# Patient Record
Sex: FEMALE | Race: WHITE | Employment: FULL TIME | URBAN - METROPOLITAN AREA
[De-identification: names, ages, dates, MRNs, and addresses within clinical notes are randomized per-mention and may not be internally consistent; named-entity substitution may affect disease eponyms.]

---

## 2017-05-24 ENCOUNTER — HOSPITAL ENCOUNTER (OUTPATIENT)
Dept: OTHER | Age: 37
Discharge: HOME | End: 2017-05-24
Attending: OBSTETRICS & GYNECOLOGY | Admitting: OBSTETRICS & GYNECOLOGY

## 2020-05-06 ENCOUNTER — HOSPITAL ENCOUNTER (OUTPATIENT)
Age: 40
Setting detail: SPECIMEN
Discharge: HOME | End: 2020-05-06
Payer: COMMERCIAL

## 2020-05-06 PROCEDURE — U0002 COVID-19 LAB TEST NON-CDC: HCPCS

## 2020-05-07 LAB
SARS-COV-2: NOT DETECTED
SOURCE: NORMAL

## 2020-06-17 ENCOUNTER — OFFICE VISIT (OUTPATIENT)
Dept: PRIMARY CARE CLINIC | Age: 40
End: 2020-06-17
Payer: COMMERCIAL

## 2020-06-17 ENCOUNTER — HOSPITAL ENCOUNTER (OUTPATIENT)
Age: 40
Setting detail: SPECIMEN
Discharge: HOME | End: 2020-06-17
Payer: COMMERCIAL

## 2020-06-17 VITALS — HEART RATE: 78 BPM | OXYGEN SATURATION: 98 % | TEMPERATURE: 98.7 F

## 2020-06-17 PROCEDURE — 99213 OFFICE O/P EST LOW 20 MIN: CPT | Performed by: NURSE PRACTITIONER

## 2020-06-17 PROCEDURE — U0002 COVID-19 LAB TEST NON-CDC: HCPCS

## 2020-06-17 NOTE — PATIENT INSTRUCTIONS
you think you may need emergency care. For example, call if you have life-threatening symptoms, such as:  · You have severe trouble breathing. (You can't talk at all.)  · You have constant chest pain or pressure. · You are severely dizzy or lightheaded. · You are confused or can't think clearly. · Your face and lips have a blue color. · You pass out (lose consciousness) or are very hard to wake up. Call your doctor now or seek immediate medical care if:  · You have moderate trouble breathing. (You can't speak a full sentence.)  · You are coughing up blood (more than about 1 teaspoon). · You have signs of low blood pressure. These include feeling lightheaded; being too weak to stand; and having cold, pale, clammy skin. Watch closely for changes in your health, and be sure to contact your doctor if:  · Your symptoms get worse. · You are not getting better as expected. Call before you go to the doctor's office. Follow their instructions. And wear a cloth face cover. Current as of: May 8, 2020               Content Version: 12.5  © 2006-2020 [employer]. Care instructions adapted under license by [facility] ([facility]). If you have questions about a medical condition or this instruction, always ask your healthcare professional. [address] any warranty or liability for your use of this information.

## 2020-06-18 LAB
SARS-COV-2: NOT DETECTED
SOURCE: NORMAL

## 2021-03-02 ENCOUNTER — HOSPITAL ENCOUNTER (OUTPATIENT)
Dept: INFUSION THERAPY | Age: 41
Discharge: HOME | End: 2021-03-02
Payer: COMMERCIAL

## 2021-03-02 ENCOUNTER — HOSPITAL ENCOUNTER (OUTPATIENT)
Dept: RADIATION ONCOLOGY | Age: 41
Discharge: HOME | End: 2021-03-02
Payer: COMMERCIAL

## 2021-03-02 VITALS
TEMPERATURE: 98.1 F | HEIGHT: 65 IN | OXYGEN SATURATION: 99 % | BODY MASS INDEX: 27.49 KG/M2 | HEART RATE: 86 BPM | SYSTOLIC BLOOD PRESSURE: 120 MMHG | DIASTOLIC BLOOD PRESSURE: 69 MMHG | RESPIRATION RATE: 16 BRPM | WEIGHT: 165 LBS

## 2021-03-02 DIAGNOSIS — C73 MALIGNANT NEOPLASM OF THYROID GLAND (HCC): ICD-10-CM

## 2021-03-02 LAB — TSH HIGH SENSITIVITY: 3.32 UIU/ML (ref 0.27–4.2)

## 2021-03-02 PROCEDURE — 86800 THYROGLOBULIN ANTIBODY: CPT

## 2021-03-02 PROCEDURE — 84432 ASSAY OF THYROGLOBULIN: CPT

## 2021-03-02 PROCEDURE — 36415 COLL VENOUS BLD VENIPUNCTURE: CPT

## 2021-03-02 PROCEDURE — 84443 ASSAY THYROID STIM HORMONE: CPT

## 2021-03-02 PROCEDURE — 99202 OFFICE O/P NEW SF 15 MIN: CPT

## 2021-03-02 RX ORDER — PHENTERMINE AND TOPIRAMATE 15; 92 MG/1; MG/1
CAPSULE, EXTENDED RELEASE ORAL
COMMUNITY
Start: 2021-02-12 | End: 2021-05-20

## 2021-03-02 NOTE — CONSULTS
Radiation Oncology Consultation  Encounter Date: 3/2/2021 1:23 PM    Ms. Claudia Chi is a 38 y.o. female  : 1980  MRN: [id number]  Acct Number: [de-identified]  Requesting Provider: No att. providers found        CONSULTANT: Jairo Leon    PHYSICIANS:   Primary Care: MD Dr. Josselyn Disla        DIAGNOSIS: Differentiated thyroid cancer      Cancer Staging  Malignant neoplasm of thyroid gland [facility])  Staging form: Thyroid - Differentiated, AJCC 8th Edition  - Pathologic: Stage I (pT1b, pN0, cM0, Age at diagnosis: < 54 years) - Signed by Jairo Leon MD on 3/2/2021         TREATMENT COURSE:  Oncology History   Malignant neoplasm of thyroid gland ([facility] [address]   2021 Initial Diagnosis    Malignant neoplasm of thyroid gland ([facility] [address]     2021 Surgery    Thyroid right lobe and isthmus partial thyroidectomy           HPI:   Today I had the privilege of seeing Claudia Chi in consultation. As you recall, Claudia Chi is a 38 y.o. female who was recently found to have thyroid cancer. She is previously in a fairly good state of health, however noticed some mild dysphagia. She was seen for a routine checkup by her primary care physician who noticed a thyroid mass. She underwent an ultrasound of the thyroid on 20, revealing a TI-RADS 5 nodule on the right measuring 1.9 cm in greatest dimension with TI-RADS 4 nodules bilaterally with the largest measuring 2 cm. On 20, she underwent FNA showing benign findings. She reports there have been multiple family members with a history of thyroid cancer. On  she underwent a partial thyroidectomy with resection of the right thyroid lobe and isthmus. 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 No angiolymphatic or perineural invasion was seen.   Diffusely nodular background thyroid parenchyma with variable nuclear atypia was noted. Some lesions were felt to represent early or incident papillary thyroid microcarcinoma is or some be considered non-invasive follicular thyroid neoplasm with papillary-like nuclear features. She presents today for consideration of her treatment options. Postoperatively, she reports she's been doing quite well. She does have fatigue. She denies any paresthesias. The dysphasia has resolved. She denies any Isabelle aphasia. She has not been experiencing any fevers, chills, or drenching night sweats. Her weight and appetite have been stable. Past Medical History:   Diagnosis Date    Arthritis     Hiatal hernia     Hyperlipidemia     IBS (irritable bowel syndrome)     Irregular heart beat     Migraine         Past Surgical History:   Procedure Laterality Date     SECTION      CHOLECYSTECTOMY      DILATION AND CURETTAGE OF UTERUS      ENDOMETRIAL ABLATION      HYSTERECTOMY      TUBAL LIGATION         History reviewed. No pertinent family history.     Social History     Socioeconomic History    Marital status:      Spouse name: Not on file    Number of children: Not on file    Years of education: Not on file    Highest education level: Not on file   Occupational History    Not on file   Social Needs    Financial resource strain: Not on file    Food insecurity     Worry: Not on file     Inability: Not on file    Transportation needs     Medical: Not on file     Non-medical: Not on file   Tobacco Use    Smoking status: Current Every Day Smoker     Packs/day: 0.25    Smokeless tobacco: Never Used   Substance and Sexual Activity    Alcohol use: No    Drug use: No    Sexual activity: Not on file   Lifestyle    Physical activity     Days per week: Not on file     Minutes per session: Not on file    Stress: Not on file   Relationships    Social connections     Talks on phone: Not on file     Gets together: Not on file     Attends Alevism service: Not on file     Active member of club or organization: Not on file     Attends meetings of clubs or organizations: Not on file     Relationship status: Not on file    Intimate partner violence     Fear of current or ex partner: Not on file     Emotionally abused: Not on file     Physically abused: Not on file     Forced sexual activity: Not on file   Other Topics Concern    Not on file   Social History Narrative    Not on file           ALLERGIES: No Known Allergies     Current Outpatient Medications   Medication Sig Dispense Refill    QSYMIA 15-92 MG CP24 TAKE 1 CAPSULE BY MOUTH EVERY DAY IN THE MORNING DNF 02/08/21      COLLAGEN-VITAMIN C PO Take by mouth      FIBER ADULT GUMMIES PO Take by mouth      Multiple Vitamins-Calcium (ONE-A-DAY WOMENS FORMULA PO) Take by mouth       No current facility-administered medications for this encounter.       Outpatient Medications Marked as Taking for the 3/2/21 encounter [facility] Encounter) with Oxana Romero MD   Medication Sig Dispense Refill    QSYMIA 15-92 MG CP24 TAKE 1 CAPSULE BY MOUTH EVERY DAY IN THE MORNING DNF 02/08/21      COLLAGEN-VITAMIN C PO Take by mouth      FIBER ADULT GUMMIES PO Take by mouth      Multiple Vitamins-Calcium (ONE-A-DAY WOMENS FORMULA PO) Take by mouth            LABORATORY STUDIES:   CBC:   Lab Results   Component Value Date    WBC 8.2 05/03/2017    RBC 4.86 05/03/2017    HGB 15.3 05/03/2017    HCT 43.9 05/03/2017    MCV 90.3 05/03/2017    MCH 31.5 05/03/2017    MCHC 34.9 05/03/2017    RDW 11.7 05/03/2017     05/03/2017    MPV 9.6 05/03/2017     CMP:  Lab Results   Component Value Date     05/03/2017    K 4.3 05/03/2017     05/03/2017    CO2 23 05/03/2017    BUN 9 05/03/2017    CREATININE 0.6 05/03/2017    GFRAA >60 05/03/2017    LABGLOM >60 05/03/2017    GLUCOSE 96 05/03/2017    PROT 7.1 05/03/2017    PROT 7.0 07/12/2011    LABALBU 4.3 05/03/2017    CALCIUM 9.1 05/03/2017    BILITOT 0.2 2017    ALKPHOS 36 2017    AST 16 2017    ALT 17 2017     Onc labs: No results found for: PSA, CEA, LDH, AFP    PATHOLOGY:   21 right thyroid lobectomy and isthmus removal:  1.9 cm papillary thyroid carcinoma well-demarcated follicular variant with parenchymal invasion and trabecular growth. No angiolymphatic or perineural invasion. Margins negative. Diffusely nodular background thyroid (nodules 0.1-1.0 cm) with a variable nuclear atypia ranging from benign hyperplastic to those that approach papillary thyroid carcinoma. Lesion may represent early or incipient papillary thyroid microcarcinoma is or some could be considered noninvasive follicular thyroid neoplasms with papillary-like nuclear features      REVIEW OF SYSTEMS:   Constitutional:  Patient denies fevers, rigors, or drenching night sweats. The patient's weight and appetite have been stable. Eyes:  The patient denies any recent visual changes, diplopia, or cataracts  ENMT:  The patient denies any ear pain, hearing changes, or nosebleeds  Respiratory:  The patient denies any SOB, hemoptysis, or green sputum production  Cardiovascular: The patient denies any chest pain, leg edema, or fainting spells  GI:  Patient denies nausea, vomiting, diarrhea, melena, or hematochezia  : Patient denies dysuria, hematuria, or urinary incontinence. Integumentary: patient denies skin rashes, pruritis, or arm edema  Endocrine:  Patient denies any diabetic problems  Neurologic: Patient denies headaches, focal weakness, or disorientation    PHYSICAL EXAMINATION:   VITAL SIGNS: /69   Pulse 86   Temp 98.1 °F (36.7 °C) (Oral)   Resp 16   Ht 5' 5\" (1.651 m)   Wt 165 lb (74.8 kg)   LMP 2017   SpO2 99%   BMI 27.46 kg/m²   ECOG PERFORMANCE STATUS: 0  PAIN RATIN    GENERAL: Pleasant well-developed adult in no acute distress. Alert and oriented ×3  SKIN: Warm and dry, without jaundice, ecchymoses, or petechiae.   HEENT: Normocephalic, atraumatic. PERRLA, Sclerae anicteric, oral mucosa moist without lesion or exudate in the visible oral cavity or oropharynx,  NECK:  No JVD; Supple. No cervical, supraclavicular, or infraclavicular lymphadenopathy is palpable. A healing thyroidectomy incision is present and without signs of infection. THORAX: No palpable axillary adenopathy;  LUNGS: Bilaterally clear to auscultation. No rales, rhonci, or wheezing are present. Good inspiratory effort, no accessory muscle use. CARDIAC: Regular rate and rhythm, without murmurs, clicks, or gallops   ABDOMEN: Normoactive bowels sounds are present. Soft. Non-tender and non-distended. No hepatosplenomegaly or masses are present. EXTREMITIES: No cyanosis, clubbing or edema is present. FROM  NEUROLOGIC: Gait unremarkable. The patient is alert and oriented. CN II-XII are grossly intact. Sensation to light touch is intact and symmetric in the fingers and feet. Muscle strength is 5/5 in both the upper and lower extremities. IMPRESSION/PLAN:  Carrillo Rick is a 38 y.o. female who was recently found to have a differentiated 1.9 cm papillary carcinoma the thyroid. I have reviewed the patient's radiographic images. The treatment options were discussed in detail with the patient. I will obtain labs today to check thyroglobulin level and will be watching for the final pathology from her upcoming completion thyroidectomy prior to making final recommendations. Should she require I-131 therapy, the potential acute side effects and chronic complications of P-290 therapy along with the preparatory procedures and strict radiation isolation precautions were discussed in detail with the patient. The patient voiced understanding, and the patient's questions were answered. The patient has decided to proceed with I-131 therapy, if indicated pending the above.   I will schedule her to return to see me approximately 2 weeks after her completion thyroidectomy. Thank-you for allowing me to participate in the care of this very pleasant patient.           Electronically signed by Jacek Hope MD on 3/2/2021 at 1:23 PM

## 2021-03-03 LAB
ANTITHYROGLOBULIN AB: <0.9 IU/ML (ref 0–4)
THYROGLOBULIN BY LC-MS/MS, SERUM/PLASMA: ABNORMAL NG/ML (ref 1.3–31.8)
THYROGLOBULIN: 139.9 NG/ML (ref 1.3–31.8)

## 2021-05-20 ENCOUNTER — HOSPITAL ENCOUNTER (OUTPATIENT)
Dept: RADIATION ONCOLOGY | Age: 41
Discharge: HOME | End: 2021-05-20
Payer: COMMERCIAL

## 2021-05-20 VITALS
BODY MASS INDEX: 29.52 KG/M2 | OXYGEN SATURATION: 99 % | WEIGHT: 177.2 LBS | HEART RATE: 72 BPM | TEMPERATURE: 98.5 F | HEIGHT: 65 IN | SYSTOLIC BLOOD PRESSURE: 125 MMHG | RESPIRATION RATE: 16 BRPM | DIASTOLIC BLOOD PRESSURE: 78 MMHG

## 2021-05-20 DIAGNOSIS — C73 MALIGNANT NEOPLASM OF THYROID GLAND (HCC): ICD-10-CM

## 2021-05-20 PROCEDURE — 99214 OFFICE O/P EST MOD 30 MIN: CPT | Performed by: RADIOLOGY

## 2021-05-20 PROCEDURE — 99211 OFF/OP EST MAY X REQ PHY/QHP: CPT

## 2021-05-20 PROCEDURE — G0463 HOSPITAL OUTPT CLINIC VISIT: HCPCS

## 2021-05-20 RX ORDER — ANTIARTHRITIC COMBINATION NO.2 900 MG
1 TABLET ORAL DAILY
COMMUNITY
End: 2022-01-27

## 2021-05-20 ASSESSMENT — PAIN SCALES - GENERAL: PAINLEVEL_OUTOF10: 0

## 2021-05-20 NOTE — PROGRESS NOTES
Chelo Snow  5/20/2021    Patient is seen today for follow up. Vitals:    05/20/21 1339   BP: 125/78   Pulse: 72   Resp: 16   Temp: 98.5 °F (36.9 °C)   SpO2: 99%        Oxygen Therapy  SpO2: 99 %  Pulse Oximeter Device Mode: Continuous  Pulse Oximeter Device Location: Left, Finger  O2 Device: None (Room air)    Wt Readings from Last 3 Encounters:   05/20/21 177 lb 3.2 oz (80.4 kg)   03/02/21 165 lb (74.8 kg)   03/16/18 180 lb (81.6 kg)       Pain Assessment  Pain Assessment: 0-10  Pain Level: 0  Patient's Stated Pain Goal: No pain  Denies Need for Intervention     No Known Allergies     Current Outpatient Medications   Medication Sig Dispense Refill    Biotin 5000 MCG TABS Take 1 tablet by mouth daily      FIBER ADULT GUMMIES PO Take by mouth      Multiple Vitamins-Calcium (ONE-A-DAY WOMENS FORMULA PO) Take by mouth       No current facility-administered medications for this encounter. Additional Comments: She states she has a spot on mid neck. Wants to discuss pathology results and next steps.       Electronically signed by Asha Sauceda CMA on 5/20/2021 at 1:42 PM

## 2021-05-20 NOTE — PROGRESS NOTES
[address] [facility]  [address] [facility], [address] [facility]  Phone: [phone]  Fax: [phone]    RADIATION ONCOLOGY FOLLOW UP REPORT    PATIENT NAME:  Jaylon Ghotra              : 1980  MEDICAL RECORD NO: [id number]    [facility] NO: [id number]        PROVIDER: Esa Michel MD      DATE OF SERVICE: 2021     FOLLOW UP PHYSICIANS:  ADILE Wellington M.D., M.D.  [address] [employer] [facility], [address] [place] [address]          DIAGNOSIS: Cancer Staging  Malignant neoplasm of thyroid gland [facility])  Staging form: Thyroid - Differentiated, AJCC 8th Edition  - Pathologic: Stage I (pT1b, pN0, cM0, Age at diagnosis: < [age] [facility] years) - Signed by Esa Michel MD on 3/2/2021         TREATMENT COURSE:   Oncology History   Malignant neoplasm of thyroid gland ([facility] [address]   2021 Initial Diagnosis    Malignant neoplasm of thyroid gland ([facility] [address]     2021 Surgery    Thyroid right lobe and isthmus partial thyroidectomy         HPI:   Jaylon Ghotra is a 42 y.o. female who has a history as above who was initially seen by me in consultation on 3/2/21 after undergoing a right thyroid lobectomy and isthmus removal.  Since that time, she did undergo a completion thyroidectomy on 21. On pathologic review the specimen, she was noted to have within the left lobe a 9 mm papillary thyroid carcinoma follicular variant with capsular invasion in a background of multiple follicular nodules with features and ranging from benign follicular nodules to noninvasive follicular thyroid neoplasm with papillary-like nuclear features. The margins were negative. Currently, the patient reports to be doing well. Energy level has been fairly good overall, however her stamina has been somewhat low. The patient denies any fevers, chills, or drenching night sweats. Weight and appetite have been stable. Denies any chest pain or shortness of breath.   Denies any new lumps or bumps anywhere throughout the body. Denies the new onset of bony pain. The patient has not been experiencing any dysphagia, odynophagia, salivary gland pain, or taste changes. She reports she had hoarseness day 1 postop which subsequently resolved. She does report some swelling in her neck which has been improving with time. The patient denies any paresthesias. She has not taken thyroid hormone supplementation postoperatively. The patient reports compliance with CDC Covid 19 measures      LABORATORY STUDIES:   CBC:   Lab Results   Component Value Date    WBC 8.2 05/03/2017    RBC 4.86 05/03/2017    HGB 15.3 05/03/2017    HCT 43.9 05/03/2017    MCV 90.3 05/03/2017    MCH 31.5 05/03/2017    MCHC 34.9 05/03/2017    RDW 11.7 05/03/2017     05/03/2017    MPV 9.6 05/03/2017     CMP:  Lab Results   Component Value Date     05/03/2017    K 4.3 05/03/2017     05/03/2017    CO2 23 05/03/2017    BUN 9 05/03/2017    CREATININE 0.6 05/03/2017    GFRAA >60 05/03/2017    LABGLOM >60 05/03/2017    GLUCOSE 96 05/03/2017    PROT 7.1 05/03/2017    PROT 7.0 07/12/2011    LABALBU 4.3 05/03/2017    CALCIUM 9.1 05/03/2017    BILITOT 0.2 05/03/2017    ALKPHOS 36 05/03/2017    AST 16 05/03/2017    ALT 17 05/03/2017     3/3/2021 10:04 PM - [facility] Incoming Lab Results From [employer] ([employer])    Component Value Ref Range & Units Status Collected Lab   Thyroglobulin 139.9High   1.3 - 31.8 ng/mL Final 03/02/2021  1:52 PM [facility]   (NOTE)   INTERPRETIVE INFORMATION: Thyroglobulin, Serum or Plasma   Specimens negative for thyroglobulin antibodies (TgAb) are tested   for thyroglobulin (Tg) by chemiluminescent immunoassay (ALE) using   the [employer] Access DxI method. Specimens with TgAb results   above the upper reference limit are tested for Tg by   high-performance liquid chromatography-tandem mass spectrometry   (LC-MS/MS). Results obtained with different test methods or kits   cannot be used interchangeably.  Tg results, regardless of   concentration, should not be interpreted as absolute evidence for   the presence or absence of papillary or follicular thyroid cancer. Tg testing is not recommended for use as a screening procedure to   detect the presence of thyroid cancer in the general population. Antithyroglobulin Ab <0.9  0.0 - 4.0 IU/mL Final 03/02/2021  1:52 PM      3/2/2021  3:24 PM - [facility] Incoming Lab Results From [employer] ([employer])    Component Value Ref Range & Units Status Collected Lab   TSH, High Sensitivity 3.320  0.270 - 4.20 uIu/ml Final 03/02/2021  1:52 PM        MEDICATIONS:   Current Outpatient Medications   Medication Sig Dispense Refill    Biotin 5000 MCG TABS Take 1 tablet by mouth daily      FIBER ADULT GUMMIES PO Take by mouth      Multiple Vitamins-Calcium (ONE-A-DAY WOMENS FORMULA PO) Take by mouth       No current facility-administered medications for this encounter. EXAMINATION:   Vitals:    05/20/21 1339   BP: 125/78   Pulse: 72   Resp: 16   Temp: 98.5 °F (36.9 °C)   SpO2: 99%     The patient is in no acute distress. Neck: Supple no preauricular postauricular, submental, submandibular, cervical, supraclavicular, or infraclavicular lymphadenopathy is present. She does have mild edema of the low neck anteriorly consistent with postoperative changes and a healing surgical incision site without signs of infection. Heart: Regular rate and rhythm. No murmurs, clicks, or gallops are present. The thyroid bed is without masses or areas of nodularity. Lungs: Bilaterally clear to auscultation. No rales, rhonchi, or wheezing are present. Abdomen: Soft, nontender and nondistended. No hepatosplenomegaly or masses are appreciated. Extremities: No cyanosis, clubbing, or edema is present. Sensation to light touch is intact and symmetric bilaterally in the fingers and feet.     ASSESSMENT AND PLAN:     Snehal Barroso is a 42 y.o. female who was recently found to have multifocal differentiated thyroid cancer with the largest focus being 1.9 cm of papillary thyroid carcinoma well demarcated follicular variant on the left and 9 mm papillary thyroid carcinoma follicular variant with capsular invasion on the right who is now approximately 1 week after completion thyroidectomy. I do believe she benefit from I-131 therapy. The potential acute side effects, chronic complications, radiation isolation precautions, and preparatory procedures were discussed in detail with the patient at the patient voiced understanding and her questions were answered. She's decided to proceed in this fashion. I will obtain labs at 1 month postop. She is to begin her low iodine diet at 2 weeks postop with tentative plans to treat her at 5 weeks postop with I-131 therapy. The patient is to continue to follow CDC's Covid 19 precautionary measures.       Electronically signed by Norma Sheth MD on 5/20/2021 at 2:09 PM

## 2021-06-09 ENCOUNTER — TELEPHONE (OUTPATIENT)
Dept: RADIATION ONCOLOGY | Age: 41
End: 2021-06-09

## 2021-06-09 DIAGNOSIS — C73 MALIGNANT NEOPLASM OF THYROID GLAND (HCC): ICD-10-CM

## 2021-06-09 NOTE — TELEPHONE ENCOUNTER
Pt started Low Iodine diet 6-1-2021 and was concerned today with a 3 lbs weight gain. Reports increased edema and tightness in hands and feet, more noticeable past couple days. Pt reports puffiness under eyes as well. Denies any other issues such as shortness of breathe or chest pain. Pt scheduled for lab draw tomorrow afternoon, advised pt scheduled for flu up for further evaluation at that time, pt states she is comfortable with this plan. If symptoms worsen, pt advised to go to ER. Pt voices understanding of the above.

## 2021-06-10 ENCOUNTER — HOSPITAL ENCOUNTER (OUTPATIENT)
Dept: RADIATION ONCOLOGY | Age: 41
Discharge: HOME | End: 2021-06-10
Payer: COMMERCIAL

## 2021-06-10 ENCOUNTER — HOSPITAL ENCOUNTER (OUTPATIENT)
Dept: INFUSION THERAPY | Age: 41
Discharge: HOME | End: 2021-06-10
Payer: COMMERCIAL

## 2021-06-10 VITALS
DIASTOLIC BLOOD PRESSURE: 80 MMHG | HEIGHT: 65 IN | HEART RATE: 69 BPM | SYSTOLIC BLOOD PRESSURE: 133 MMHG | OXYGEN SATURATION: 98 % | WEIGHT: 183.4 LBS | RESPIRATION RATE: 16 BRPM | TEMPERATURE: 98.2 F | BODY MASS INDEX: 30.56 KG/M2

## 2021-06-10 DIAGNOSIS — C73 MALIGNANT NEOPLASM OF THYROID GLAND (HCC): ICD-10-CM

## 2021-06-10 LAB
ALBUMIN SERPL-MCNC: 5.1 GM/DL (ref 3.4–5)
ALP BLD-CCNC: 45 IU/L (ref 40–129)
ALT SERPL-CCNC: 40 U/L (ref 10–40)
ANION GAP SERPL CALCULATED.3IONS-SCNC: 10 MMOL/L (ref 4–16)
AST SERPL-CCNC: 29 IU/L (ref 15–37)
BILIRUB SERPL-MCNC: 0.2 MG/DL (ref 0–1)
BUN BLDV-MCNC: 13 MG/DL (ref 6–23)
CALCIUM SERPL-MCNC: 9.1 MG/DL (ref 8.3–10.6)
CHLORIDE BLD-SCNC: 99 MMOL/L (ref 99–110)
CO2: 26 MMOL/L (ref 21–32)
CREAT SERPL-MCNC: 0.8 MG/DL (ref 0.6–1.1)
GFR AFRICAN AMERICAN: >60 ML/MIN/1.73M2
GFR NON-AFRICAN AMERICAN: >60 ML/MIN/1.73M2
GLUCOSE BLD-MCNC: 86 MG/DL (ref 70–99)
POTASSIUM SERPL-SCNC: 4.2 MMOL/L (ref 3.5–5.1)
SODIUM BLD-SCNC: 135 MMOL/L (ref 135–145)
TOTAL PROTEIN: 6.8 GM/DL (ref 6.4–8.2)
TSH HIGH SENSITIVITY: 63.7 UIU/ML (ref 0.27–4.2)

## 2021-06-10 PROCEDURE — 99212 OFFICE O/P EST SF 10 MIN: CPT | Performed by: RADIOLOGY

## 2021-06-10 PROCEDURE — 99211 OFF/OP EST MAY X REQ PHY/QHP: CPT

## 2021-06-10 PROCEDURE — 80053 COMPREHEN METABOLIC PANEL: CPT

## 2021-06-10 PROCEDURE — 84443 ASSAY THYROID STIM HORMONE: CPT

## 2021-06-10 PROCEDURE — G0463 HOSPITAL OUTPT CLINIC VISIT: HCPCS

## 2021-06-10 PROCEDURE — 36415 COLL VENOUS BLD VENIPUNCTURE: CPT

## 2021-06-10 PROCEDURE — 86800 THYROGLOBULIN ANTIBODY: CPT

## 2021-06-10 PROCEDURE — 84432 ASSAY OF THYROGLOBULIN: CPT

## 2021-06-10 ASSESSMENT — PAIN DESCRIPTION - PROGRESSION: CLINICAL_PROGRESSION: GRADUALLY WORSENING

## 2021-06-10 ASSESSMENT — PAIN SCALES - GENERAL: PAINLEVEL_OUTOF10: 6

## 2021-06-10 ASSESSMENT — PAIN DESCRIPTION - PAIN TYPE: TYPE: ACUTE PAIN

## 2021-06-10 ASSESSMENT — PAIN DESCRIPTION - FREQUENCY: FREQUENCY: INTERMITTENT

## 2021-06-10 ASSESSMENT — PAIN DESCRIPTION - ORIENTATION: ORIENTATION: RIGHT;LEFT

## 2021-06-10 ASSESSMENT — PAIN DESCRIPTION - ONSET: ONSET: ON-GOING

## 2021-06-10 ASSESSMENT — PAIN DESCRIPTION - LOCATION: LOCATION: LEG

## 2021-06-10 ASSESSMENT — PAIN DESCRIPTION - DESCRIPTORS: DESCRIPTORS: ACHING;SHOOTING

## 2021-06-10 ASSESSMENT — PAIN - FUNCTIONAL ASSESSMENT: PAIN_FUNCTIONAL_ASSESSMENT: ACTIVITIES ARE NOT PREVENTED

## 2021-06-10 NOTE — PROGRESS NOTES
[address] [facility]  [address] [facility], [address] [facility]  Phone: [phone]  Fax: [phone]    RADIATION ONCOLOGY FOLLOW UP REPORT    PATIENT NAME:  Rockwell Sandifer              : 1980  MEDICAL RECORD NO: [id number]    [facility] NO: [id number]        PROVIDER: Gabby Love MD      DATE OF SERVICE: 6/10/2021     FOLLOW UP PHYSICIANS:  Lani Pompa M.D.  [address] [employer] [facility], [address] [facility]          DIAGNOSIS: Cancer Staging  Malignant neoplasm of thyroid gland [facility])  Staging form: Thyroid - Differentiated, AJCC 8th Edition  - Pathologic: Stage I (pT1b, pN0, cM0, Age at diagnosis: < 54 years) - Signed by Gabby Love MD on 3/2/2021         TREATMENT COURSE:   Oncology History   Malignant neoplasm of thyroid gland ([facility] [address]   2021 Initial Diagnosis    Malignant neoplasm of thyroid gland ([facility] [address]     2021 Surgery    Thyroid right lobe and isthmus partial thyroidectomy     2021 Surgery    Thyroid left lobe completion thyroidectomy         HPI:   Rockwell Sandifer is a 42 y.o. female who was recently found to have differentiated thyroid cancer and has been on her low iodine diet without thyroid hormone supplementation. She complains of fatigue, weight gain, and diffuse mild skin/subcutaneous edema. She has been wearing compression stockings to help with the lower extremity edema. She reports her rings have been tight lately. She denies any fevers, chills, or drenching night sweats.       The patient reports compliance with CDC Covid 19 measures          LABORATORY STUDIES:   CBC:   Lab Results   Component Value Date    WBC 8.2 2017    RBC 4.86 2017    HGB 15.3 2017    HCT 43.9 2017    MCV 90.3 2017    MCH 31.5 2017    MCHC 34.9 2017    RDW 11.7 2017     2017    MPV 9.6 2017     CMP:  Lab Results   Component Value Date     2017    K 4.3 2017     2017

## 2021-06-10 NOTE — PROGRESS NOTES
Chelo Bhandari  6/10/2021    Patient is seen today for follow up. Vitals:    06/10/21 1418   BP: 133/80   Pulse: 69   Resp: 16   Temp: 98.2 °F (36.8 °C)   SpO2: 98%        Oxygen Therapy  SpO2: 98 %  Pulse Oximeter Device Mode: Intermittent  Pulse Oximeter Device Location: Right, Finger  O2 Device: None (Room air)    Wt Readings from Last 3 Encounters:   06/10/21 183 lb 6.4 oz (83.2 kg)   05/20/21 177 lb 3.2 oz (80.4 kg)   03/02/21 165 lb (74.8 kg)       Pain Assessment  Pain Assessment: 0-10  Pain Level: 6  Patient's Stated Pain Goal: No pain  Pain Type: Acute pain  Pain Location: Leg  Pain Orientation: Right, Left  Pain Descriptors: Aching, Shooting  Pain Frequency: Intermittent  Pain Onset: On-going  Clinical Progression: Gradually worsening  Functional Pain Assessment: Activities are not prevented  Non-Pharmaceutical Pain Intervention(s): Relaxation techniques, Repositioned, Rest, Other (Comment) (NORI Hose)  Multiple Pain Sites: No  OTC Analgesics, Aleve PRN     No Known Allergies     Current Outpatient Medications   Medication Sig Dispense Refill    Biotin 5000 MCG TABS Take 1 tablet by mouth daily      FIBER ADULT GUMMIES PO Take by mouth      Multiple Vitamins-Calcium (ONE-A-DAY WOMENS FORMULA PO) Take by mouth       No current facility-administered medications for this encounter. Additional Comments: Pt with concerns today regarding weight gain and swelling in past few days. Started Low Iodine Diet 6-1-2021. C/o new bilat leg aches x 2 days. Pt to get lab draw for upcoming I 131 Thyroid Ablation.     Electronically signed by Joselito Parnell RN on 6/10/2021 at 2:22 PM

## 2021-06-12 LAB
ANTITHYROGLOBULIN AB: <0.9 IU/ML (ref 0–4)
THYROGLOBULIN BY LC-MS/MS, SERUM/PLASMA: NORMAL NG/ML (ref 1.3–31.8)
THYROGLOBULIN: 16.5 NG/ML (ref 1.3–31.8)

## 2021-06-14 ENCOUNTER — HOSPITAL ENCOUNTER (OUTPATIENT)
Dept: NUCLEAR MEDICINE | Age: 41
Discharge: HOME | End: 2021-06-14
Payer: COMMERCIAL

## 2021-06-14 PROCEDURE — 78018 THYROID MET IMAGING BODY: CPT

## 2021-06-14 PROCEDURE — 3430000000 HC RX DIAGNOSTIC RADIOPHARMACEUTICAL: Performed by: RADIOLOGY

## 2021-06-14 PROCEDURE — A9509 IODINE I-123 SOD IODIDE MIL: HCPCS | Performed by: RADIOLOGY

## 2021-06-14 RX ADMIN — Medication 1.8 MILLICURIE: at 14:00

## 2021-06-15 ENCOUNTER — HOSPITAL ENCOUNTER (OUTPATIENT)
Dept: NUCLEAR MEDICINE | Age: 41
Discharge: HOME | End: 2021-06-15
Payer: COMMERCIAL

## 2021-06-15 ENCOUNTER — APPOINTMENT (OUTPATIENT)
Dept: NUCLEAR MEDICINE | Age: 41
End: 2021-06-15
Payer: COMMERCIAL

## 2021-06-16 ENCOUNTER — APPOINTMENT (OUTPATIENT)
Dept: NUCLEAR MEDICINE | Age: 41
End: 2021-06-16
Payer: COMMERCIAL

## 2021-06-16 DIAGNOSIS — C73 THYROID CANCER (HCC): Primary | ICD-10-CM

## 2021-06-16 RX ORDER — PROCHLORPERAZINE MALEATE 10 MG
10 TABLET ORAL EVERY 6 HOURS PRN
Qty: 21 TABLET | Refills: 0 | Status: SHIPPED | OUTPATIENT
Start: 2021-06-16 | End: 2022-01-27

## 2021-06-17 ENCOUNTER — HOSPITAL ENCOUNTER (OUTPATIENT)
Dept: NUCLEAR MEDICINE | Age: 41
Discharge: HOME | End: 2021-06-17
Payer: COMMERCIAL

## 2021-06-17 ENCOUNTER — HOSPITAL ENCOUNTER (OUTPATIENT)
Dept: RADIATION ONCOLOGY | Age: 41
Discharge: HOME | End: 2021-06-17
Payer: COMMERCIAL

## 2021-06-17 PROCEDURE — 3430000000 HC RX DIAGNOSTIC RADIOPHARMACEUTICAL: Performed by: RADIOLOGY

## 2021-06-17 PROCEDURE — 79005 NUCLEAR RX ORAL ADMIN: CPT

## 2021-06-17 PROCEDURE — A9517 I131 IODIDE CAP, RX: HCPCS | Performed by: RADIOLOGY

## 2021-06-17 RX ADMIN — SODIUM IODIDE I 131 52.4 MILLICURIE: 1 CAPSULE, GELATIN COATED ORAL at 11:30

## 2021-06-17 NOTE — PROCEDURES
Nuclear Medicine Procedure: Iodine 131 radionuclide therapy    Clinical Indications:  Differentiated Thyroid Cancer    Date:  6/17/2021    Procedure Details: The patient was brought to the nuclear medicine department and identity was confirmed. The patient was recently diagnosed with a differentiated thyroid cancer and has undergone a total thyroidectomy. The patient has been off thyroid replacement hormone to have adequate stimulation with a recent TSH of 63.7 uIu/ml       I 123 scan was obtained post-operatively revealing:    NM THYROID METASTASES SCAN WHOLE BODY    Result Date: 6/15/2021  EXAMINATION: TOTAL BODY I-123 SCAN HISTORY: ORDERING SYSTEM PROVIDED HISTORY: Thyroid ca TECHNOLOGIST PROVIDED HISTORY: I 123 Diagnostic Dose Day 1 6-14 I 123 Diagnostic Scan Day 2 6-15 Reason for exam:->Thyroid ca Is the patient pregnant?->No Reason for Exam: thryoid cancer Acuity: Unknown Type of Exam: Unknown COMPARISON: None METHOD: The patient received orally  I-123 sodium and total body scintigraphy was performed 1 days later. FINDINGS: Focal areas of increased uptake within the neck are present. There was physiologic distribution of radioactivity. Focal areas of increased uptake within the neck may represent residual thyroid tissue and/or thyroid cancer. No iodine avid distant metastatic disease. I have recommended radioactive I 131 ablative therapy. The patient has been premedicated with antiemetics and has been on a low iodine diet for at least two weeks. The patient was counseled as to the potential risks associated with iodine 131 therapy, including nausea, vomiting, salivary gland pain, etc.  The patient was also instructed as to the strict radiation isolation precautions required for 3 days for adults and 5-7 days for minors.   The patient has been advised to have a separate bedroom/living area and separate bathroom for these days, as all body fluids are radioactive, with linens and dishes being washed separately. The patient was treated with I 131 therapy in the form of two capsules. A total of 50 mCi was prescribed. The pretreatment vial assayed at 52.4 mCi, which is within the acceptable 10% variation limits. I observed the patient swallow the therapy dose. The post-treatment vial assayed at <.5mCi, for a total administered dose of 52.4 mCi. The patient was then observed for 30 minutes after the procedure. No acute complications arose. The patient is to undergo a whole body scan in 5-7 days, returning to my office shortly thereafter.          Electronically signed by Brayden Torres MD on 6/17/2021 at 11:44 AM

## 2021-06-22 ENCOUNTER — HOSPITAL ENCOUNTER (OUTPATIENT)
Dept: NUCLEAR MEDICINE | Age: 41
Discharge: HOME | End: 2021-06-22
Payer: COMMERCIAL

## 2021-06-22 PROCEDURE — 78018 THYROID MET IMAGING BODY: CPT

## 2021-07-09 ENCOUNTER — HOSPITAL ENCOUNTER (EMERGENCY)
Age: 41
Discharge: HOME | End: 2021-07-10
Attending: EMERGENCY MEDICINE
Payer: COMMERCIAL

## 2021-07-09 ENCOUNTER — APPOINTMENT (OUTPATIENT)
Dept: CT IMAGING | Age: 41
End: 2021-07-09
Payer: COMMERCIAL

## 2021-07-09 ENCOUNTER — TELEPHONE (OUTPATIENT)
Dept: RADIATION ONCOLOGY | Age: 41
End: 2021-07-09

## 2021-07-09 DIAGNOSIS — Z85.850 HISTORY OF THYROID CANCER: ICD-10-CM

## 2021-07-09 DIAGNOSIS — R51.9 NONINTRACTABLE HEADACHE, UNSPECIFIED CHRONICITY PATTERN, UNSPECIFIED HEADACHE TYPE: Primary | ICD-10-CM

## 2021-07-09 DIAGNOSIS — C73 MALIGNANT NEOPLASM OF THYROID GLAND (HCC): ICD-10-CM

## 2021-07-09 LAB
ALBUMIN SERPL-MCNC: 5.8 GM/DL (ref 3.4–5)
ALP BLD-CCNC: 53 IU/L (ref 40–129)
ALT SERPL-CCNC: 26 U/L (ref 10–40)
ANION GAP SERPL CALCULATED.3IONS-SCNC: 14 MMOL/L (ref 4–16)
AST SERPL-CCNC: 24 IU/L (ref 15–37)
BASOPHILS ABSOLUTE: 0.1 K/CU MM
BASOPHILS RELATIVE PERCENT: 0.7 % (ref 0–1)
BILIRUB SERPL-MCNC: 0.4 MG/DL (ref 0–1)
BUN BLDV-MCNC: 19 MG/DL (ref 6–23)
CALCIUM SERPL-MCNC: 9.8 MG/DL (ref 8.3–10.6)
CHLORIDE BLD-SCNC: 99 MMOL/L (ref 99–110)
CO2: 24 MMOL/L (ref 21–32)
CREAT SERPL-MCNC: 0.6 MG/DL (ref 0.6–1.1)
DIFFERENTIAL TYPE: ABNORMAL
EOSINOPHILS ABSOLUTE: 0.1 K/CU MM
EOSINOPHILS RELATIVE PERCENT: 1 % (ref 0–3)
GFR AFRICAN AMERICAN: >60 ML/MIN/1.73M2
GFR NON-AFRICAN AMERICAN: >60 ML/MIN/1.73M2
GLUCOSE BLD-MCNC: 108 MG/DL (ref 70–99)
HCT VFR BLD CALC: 41.9 % (ref 37–47)
HEMOGLOBIN: 14.6 GM/DL (ref 12.5–16)
IMMATURE NEUTROPHIL %: 0.5 % (ref 0–0.43)
LYMPHOCYTES ABSOLUTE: 2.5 K/CU MM
LYMPHOCYTES RELATIVE PERCENT: 33.6 % (ref 24–44)
MAGNESIUM: 2.3 MG/DL (ref 1.8–2.4)
MCH RBC QN AUTO: 31.6 PG (ref 27–31)
MCHC RBC AUTO-ENTMCNC: 34.8 % (ref 32–36)
MCV RBC AUTO: 90.7 FL (ref 78–100)
MONOCYTES ABSOLUTE: 0.6 K/CU MM
MONOCYTES RELATIVE PERCENT: 8.6 % (ref 0–4)
NUCLEATED RBC %: 0 %
PDW BLD-RTO: 12.1 % (ref 11.7–14.9)
PLATELET # BLD: 267 K/CU MM (ref 140–440)
PMV BLD AUTO: 9.4 FL (ref 7.5–11.1)
POTASSIUM SERPL-SCNC: 3.8 MMOL/L (ref 3.5–5.1)
RBC # BLD: 4.62 M/CU MM (ref 4.2–5.4)
SEGMENTED NEUTROPHILS ABSOLUTE COUNT: 4.1 K/CU MM
SEGMENTED NEUTROPHILS RELATIVE PERCENT: 55.6 % (ref 36–66)
SODIUM BLD-SCNC: 137 MMOL/L (ref 135–145)
T4 FREE: 1.34 NG/DL (ref 0.9–1.8)
TOTAL IMMATURE NEUTOROPHIL: 0.04 K/CU MM
TOTAL NUCLEATED RBC: 0 K/CU MM
TOTAL PROTEIN: 8.4 GM/DL (ref 6.4–8.2)
TSH HIGH SENSITIVITY: 8.44 UIU/ML (ref 0.27–4.2)
WBC # BLD: 7.4 K/CU MM (ref 4–10.5)

## 2021-07-09 PROCEDURE — 85025 COMPLETE CBC W/AUTO DIFF WBC: CPT

## 2021-07-09 PROCEDURE — 70496 CT ANGIOGRAPHY HEAD: CPT

## 2021-07-09 PROCEDURE — 70450 CT HEAD/BRAIN W/O DYE: CPT

## 2021-07-09 PROCEDURE — 84443 ASSAY THYROID STIM HORMONE: CPT

## 2021-07-09 PROCEDURE — 80053 COMPREHEN METABOLIC PANEL: CPT

## 2021-07-09 PROCEDURE — 99283 EMERGENCY DEPT VISIT LOW MDM: CPT

## 2021-07-09 PROCEDURE — 83735 ASSAY OF MAGNESIUM: CPT

## 2021-07-09 PROCEDURE — 6360000004 HC RX CONTRAST MEDICATION: Performed by: EMERGENCY MEDICINE

## 2021-07-09 PROCEDURE — 96374 THER/PROPH/DIAG INJ IV PUSH: CPT

## 2021-07-09 PROCEDURE — 96375 TX/PRO/DX INJ NEW DRUG ADDON: CPT

## 2021-07-09 PROCEDURE — 36415 COLL VENOUS BLD VENIPUNCTURE: CPT

## 2021-07-09 PROCEDURE — 84439 ASSAY OF FREE THYROXINE: CPT

## 2021-07-09 RX ORDER — METOCLOPRAMIDE HYDROCHLORIDE 5 MG/ML
10 INJECTION INTRAMUSCULAR; INTRAVENOUS ONCE
Status: COMPLETED | OUTPATIENT
Start: 2021-07-09 | End: 2021-07-10

## 2021-07-09 RX ORDER — SODIUM CHLORIDE 0.9 % (FLUSH) 0.9 %
5-40 SYRINGE (ML) INJECTION 2 TIMES DAILY
Status: DISCONTINUED | OUTPATIENT
Start: 2021-07-09 | End: 2021-07-10 | Stop reason: HOSPADM

## 2021-07-09 RX ORDER — 0.9 % SODIUM CHLORIDE 0.9 %
1000 INTRAVENOUS SOLUTION INTRAVENOUS ONCE
Status: COMPLETED | OUTPATIENT
Start: 2021-07-09 | End: 2021-07-10

## 2021-07-09 RX ORDER — ACETAMINOPHEN 500 MG
1000 TABLET ORAL ONCE
Status: COMPLETED | OUTPATIENT
Start: 2021-07-09 | End: 2021-07-10

## 2021-07-09 RX ORDER — DIPHENHYDRAMINE HYDROCHLORIDE 50 MG/ML
50 INJECTION INTRAMUSCULAR; INTRAVENOUS ONCE
Status: COMPLETED | OUTPATIENT
Start: 2021-07-09 | End: 2021-07-10

## 2021-07-09 RX ADMIN — IOPAMIDOL 100 ML: 755 INJECTION, SOLUTION INTRAVENOUS at 23:57

## 2021-07-09 ASSESSMENT — ENCOUNTER SYMPTOMS
RESPIRATORY NEGATIVE: 1
PHOTOPHOBIA: 1
ALLERGIC/IMMUNOLOGIC NEGATIVE: 1
NAUSEA: 1

## 2021-07-09 ASSESSMENT — PAIN DESCRIPTION - PAIN TYPE: TYPE: ACUTE PAIN

## 2021-07-09 ASSESSMENT — PAIN SCALES - GENERAL: PAINLEVEL_OUTOF10: 6

## 2021-07-09 NOTE — TELEPHONE ENCOUNTER
Pt called c/o increasing malaise past few days. Reports neck hurting up into head, causing a headache so severe that it makes her feel like she is going to pass out at times. Pt denies any difficulty breathing and no difficulty swallowing and is afebrile. States eating and drinking without difficulty and quantity sufficient. Pt states the feeling is difficulty to explain, has had migraine in past and states this feels different from those. Currently  rates headache \"7/10\". Consulted with  and pt advised to go to ER for further evaluation, as this is not a typical response from the Thyroid Ablation she received on 6/17/2021. Pt voices understanding, states will call office with further questions and concerns. Statement Selected

## 2021-07-10 VITALS
HEART RATE: 67 BPM | DIASTOLIC BLOOD PRESSURE: 80 MMHG | SYSTOLIC BLOOD PRESSURE: 145 MMHG | WEIGHT: 180 LBS | TEMPERATURE: 98.2 F | RESPIRATION RATE: 20 BRPM | HEIGHT: 65 IN | OXYGEN SATURATION: 98 % | BODY MASS INDEX: 29.99 KG/M2

## 2021-07-10 LAB
BACTERIA: ABNORMAL /HPF
BILIRUBIN URINE: NEGATIVE MG/DL
BLOOD, URINE: ABNORMAL
CLARITY: CLEAR
COLOR: YELLOW
GLUCOSE, URINE: NEGATIVE MG/DL
KETONES, URINE: NEGATIVE MG/DL
LEUKOCYTE ESTERASE, URINE: NEGATIVE
MUCUS: ABNORMAL HPF
NITRITE URINE, QUANTITATIVE: NEGATIVE
PH, URINE: 6 (ref 5–8)
PROTEIN UA: NEGATIVE MG/DL
RBC URINE: 5 /HPF (ref 0–6)
SPECIFIC GRAVITY UA: 1.02 (ref 1–1.03)
SQUAMOUS EPITHELIAL: 2 /HPF
TRICHOMONAS: ABNORMAL /HPF
UROBILINOGEN, URINE: NEGATIVE MG/DL (ref 0.2–1)
WBC UA: 1 /HPF (ref 0–5)

## 2021-07-10 PROCEDURE — 6360000002 HC RX W HCPCS: Performed by: EMERGENCY MEDICINE

## 2021-07-10 PROCEDURE — 6370000000 HC RX 637 (ALT 250 FOR IP): Performed by: EMERGENCY MEDICINE

## 2021-07-10 PROCEDURE — 81001 URINALYSIS AUTO W/SCOPE: CPT

## 2021-07-10 PROCEDURE — 2580000003 HC RX 258: Performed by: EMERGENCY MEDICINE

## 2021-07-10 RX ORDER — BUTALBITAL, ACETAMINOPHEN AND CAFFEINE 300; 40; 50 MG/1; MG/1; MG/1
1 CAPSULE ORAL EVERY 4 HOURS PRN
Qty: 84 CAPSULE | Refills: 0 | Status: SHIPPED | OUTPATIENT
Start: 2021-07-10

## 2021-07-10 RX ORDER — ONDANSETRON 4 MG/1
4 TABLET, ORALLY DISINTEGRATING ORAL EVERY 8 HOURS PRN
Qty: 15 TABLET | Refills: 0 | Status: SHIPPED | OUTPATIENT
Start: 2021-07-10 | End: 2022-01-27

## 2021-07-10 RX ADMIN — METOCLOPRAMIDE 10 MG: 5 INJECTION, SOLUTION INTRAMUSCULAR; INTRAVENOUS at 00:10

## 2021-07-10 RX ADMIN — SODIUM CHLORIDE, PRESERVATIVE FREE 10 ML: 5 INJECTION INTRAVENOUS at 00:16

## 2021-07-10 RX ADMIN — ACETAMINOPHEN 1000 MG: 500 TABLET ORAL at 00:11

## 2021-07-10 RX ADMIN — DIPHENHYDRAMINE HYDROCHLORIDE 50 MG: 50 INJECTION INTRAMUSCULAR; INTRAVENOUS at 00:11

## 2021-07-10 RX ADMIN — SODIUM CHLORIDE 1000 ML: 9 INJECTION, SOLUTION INTRAVENOUS at 00:11

## 2021-07-10 ASSESSMENT — PAIN SCALES - GENERAL
PAINLEVEL_OUTOF10: 8
PAINLEVEL_OUTOF10: 8

## 2021-07-10 NOTE — ED PROVIDER NOTES
[address] [facility]      TRIAGE CHIEF COMPLAINT:   Headache (sent by neurologist dr Jones Board for TSH levels to be checked. HA and neck pain following radiation treatment on  for thyroid cancer per pt)      [place]:  Marcie Torres is a 42 y.o. female that presents complaint of headache. Patient states she has a history of thyroid cancer status post surgery and radiation she has been having a headache, neck pain for the last few days got worse today. She has been taking Motrin with some relief. Feels little lightheaded today, nauseous has photophobia phonophobia. Sent here by neurologist for labs. She denies any weakness numbness or tingling no slurred speech or facial droop no history of brain tumor or aneurysm no trauma loss of consciousness. No fevers no nuchal rigidity no sore throat denies pregnancy she had a complete hysterectomy. No other questions or concerns just generalized headache and some neck pain on the sides. REVIEW OF SYSTEMS:  At least 10 systems reviewed and otherwise acutely negative except as in the [address] Review of Systems   Constitutional: Positive for fatigue. HENT: Negative. Eyes: Positive for photophobia. Respiratory: Negative. Cardiovascular: Negative. Gastrointestinal: Positive for nausea. Endocrine: Negative. Genitourinary: Negative. Musculoskeletal: Negative. Skin: Negative. Allergic/Immunologic: Negative. Neurological: Positive for light-headedness and headaches. Psychiatric/Behavioral: Negative. All other systems reviewed and are negative.       Past Medical History:   Diagnosis Date    Arthritis     Cancer ([address]     Hiatal hernia     Hyperlipidemia     IBS (irritable bowel syndrome)     Irregular heart beat     Migraine      Past Surgical History:   Procedure Laterality Date     SECTION      CHOLECYSTECTOMY      DILATION AND CURETTAGE OF UTERUS      ENDOMETRIAL ABLATION      HYSTERECTOMY      TUBAL LIGATION       History reviewed. No pertinent family history. Social History     Socioeconomic History    Marital status:      Spouse name: Not on file    Number of children: Not on file    Years of education: Not on file    Highest education level: Not on file   Occupational History    Not on file   Tobacco Use    Smoking status: Current Every Day Smoker     Packs/day: 0.25    Smokeless tobacco: Never Used   Substance and Sexual Activity    Alcohol use: No    Drug use: No    Sexual activity: Not on file   Other Topics Concern    Not on file   Social History Narrative    Not on file     Social Determinants of Health     Financial Resource Strain:     Difficulty of Paying Living Expenses:    Food Insecurity:     Worried About Running Out of Food in the Last Year:     [address] in the Last Year:    Transportation Needs:     Lack of Transportation (Medical):      Lack of Transportation (Non-Medical):    Physical Activity:     Days of Exercise per Week:     Minutes of Exercise per Session:    Stress:     Feeling of Stress :    Social Connections:     Frequency of Communication with Friends and Family:     Frequency of Social Gatherings with Friends and Family:     Attends Spiritism Services:     Active Member of Clubs or Organizations:     Attends Club or Organization Meetings:     Marital Status:    Intimate Partner Violence:     Fear of Current or Ex-Partner:     Emotionally Abused:     Physically Abused:     Sexually Abused:      Current Facility-Administered Medications   Medication Dose Route Frequency Provider Last Rate Last Admin    0.9 % sodium chloride bolus  1,000 mL Intravenous Once Morris Simon, DO 1,000 mL/hr at 07/10/21 0011 1,000 mL at 07/10/21 0011    sodium chloride flush 0.9 % injection 5-40 mL  5-40 mL Intravenous [address] [place], DO   10 mL at 07/10/21 0016     Current Outpatient Medications   Medication Sig Dispense Refill    butalbital-APAP-caffeine (FIORICET) -40 MG CAPS per capsule Take 1 capsule by mouth every 4 hours as needed for Headaches 84 capsule 0    ondansetron (ZOFRAN ODT) 4 MG disintegrating tablet Take 1 tablet by mouth every 8 hours as needed for Nausea 15 tablet 0    prochlorperazine (COMPAZINE) 10 MG tablet Take 1 tablet by mouth every 6 hours as needed (nausea) 21 tablet 0    Biotin 5000 MCG TABS Take 1 tablet by mouth daily      FIBER ADULT GUMMIES PO Take by mouth      Multiple Vitamins-Calcium (ONE-A-DAY WOMENS FORMULA PO) Take by mouth        No Known Allergies  Current Facility-Administered Medications   Medication Dose Route Frequency Provider Last Rate Last Admin    0.9 % sodium chloride bolus  1,000 mL Intravenous Once Baltazar Vasquez, DO 1,000 mL/hr at 07/10/21 0011 1,000 mL at 07/10/21 0011    sodium chloride flush 0.9 % injection 5-40 mL  5-40 mL Intravenous [address] [place], DO   10 mL at 07/10/21 0016     Current Outpatient Medications   Medication Sig Dispense Refill    butalbital-APAP-caffeine (FIORICET) -40 MG CAPS per capsule Take 1 capsule by mouth every 4 hours as needed for Headaches 84 capsule 0    ondansetron (ZOFRAN ODT) 4 MG disintegrating tablet Take 1 tablet by mouth every 8 hours as needed for Nausea 15 tablet 0    prochlorperazine (COMPAZINE) 10 MG tablet Take 1 tablet by mouth every 6 hours as needed (nausea) 21 tablet 0    Biotin 5000 MCG TABS Take 1 tablet by mouth daily      FIBER ADULT GUMMIES PO Take by mouth      Multiple Vitamins-Calcium (ONE-A-DAY WOMENS FORMULA PO) Take by mouth         Nursing Notes Reviewed    VITAL SIGNS:  ED Triage Vitals [07/09/21 1635]   Enc Vitals Group      BP (!) 157/110      Pulse 73      Resp 16      Temp 98.5 °F (36.9 °C)      Temp Source Oral      SpO2 97 %      Weight 180 lb (81.6 kg)      Height 5' 5\" (1.651 m)      Head Circumference       Peak Flow       Pain Score       Pain Loc       Pain Edu? Excl.  in [address]? PHYSICAL EXAM:  Physical Exam  Vitals and nursing note reviewed. Constitutional:       General: She is not in acute distress. Appearance: Normal appearance. She is well-developed and well-groomed. She is not ill-appearing, toxic-appearing or diaphoretic. HENT:      Head: Normocephalic and atraumatic. Right Ear: External ear normal.      Left Ear: External ear normal.      Nose: No congestion or rhinorrhea. Mouth/Throat:      Mouth: Mucous membranes are moist.      Pharynx: Oropharynx is clear. No oropharyngeal exudate or posterior oropharyngeal erythema. Eyes:      General: No scleral icterus. Right eye: No discharge. Left eye: No discharge. Extraocular Movements: Extraocular movements intact. Conjunctiva/sclera: Conjunctivae normal.      Pupils: Pupils are equal, round, and reactive to light. Neck:      Vascular: No carotid bruit or JVD. Trachea: Phonation normal.   Cardiovascular:      Rate and Rhythm: Normal rate and regular rhythm. Pulses: Normal pulses. Heart sounds: Normal heart sounds. No murmur heard. No friction rub. No gallop. Pulmonary:      Effort: Pulmonary effort is normal. No respiratory distress. Breath sounds: Normal breath sounds. No stridor. No wheezing, rhonchi or rales. Abdominal:      General: Bowel sounds are normal. There is no distension. Palpations: Abdomen is soft. There is no mass. Tenderness: There is no abdominal tenderness. There is no guarding or rebound. Negative signs include Sands's sign, Rovsing's sign and McBurney's sign. Hernia: No hernia is present. Musculoskeletal:         General: Tenderness present. No swelling, deformity or signs of injury. Normal range of motion. Cervical back: Full passive range of motion without pain and normal range of motion. Tenderness present. No edema, erythema, signs of trauma, rigidity, torticollis or crepitus. No pain with movement.  Normal range of motion. Right lower leg: No edema. Left lower leg: No edema. Lymphadenopathy:      Cervical: No cervical adenopathy. Skin:     General: Skin is warm. Coloration: Skin is not jaundiced or pale. Findings: No bruising, erythema, lesion or rash. Neurological:      General: No focal deficit present. Mental Status: She is alert and oriented to person, place, and time. GCS: GCS eye subscore is 4. GCS verbal subscore is 5. GCS motor subscore is 6. Cranial Nerves: Cranial nerves are intact. No cranial nerve deficit, dysarthria or facial asymmetry. Sensory: Sensation is intact. No sensory deficit. Motor: Motor function is intact. No weakness, tremor, atrophy or abnormal muscle tone. Coordination: Coordination is intact. Coordination normal.   Psychiatric:         Mood and Affect: Mood normal.         Behavior: Behavior normal. Behavior is cooperative. Thought Content:  Thought content normal.         Judgment: Judgment normal.           I have reviewed andinterpreted all of the currently available lab results from this visit (if applicable):    Results for orders placed or performed during the hospital encounter of 07/09/21   TSH without Reflex   Result Value Ref Range    TSH, High Sensitivity 8.440 (H) 0.270 - 4.20 uIu/ml   T4, free   Result Value Ref Range    T4 Free 1.34 0.9 - 1.8 NG/DL   CBC with Auto Diff   Result Value Ref Range    WBC 7.4 4.0 - 10.5 K/CU MM    RBC 4.62 4.2 - 5.4 M/CU MM    Hemoglobin 14.6 12.5 - 16.0 GM/DL    Hematocrit 41.9 37 - 47 %    MCV 90.7 78 - 100 FL    MCH 31.6 (H) 27 - 31 PG    MCHC 34.8 32.0 - 36.0 %    RDW 12.1 11.7 - 14.9 %    Platelets 823 336 - 814 K/CU MM    MPV 9.4 7.5 - 11.1 FL    Differential Type AUTOMATED DIFFERENTIAL     Segs Relative 55.6 36 - 66 %    Lymphocytes % 33.6 24 - 44 %    Monocytes % 8.6 (H) 0 - 4 %    Eosinophils % 1.0 0 - 3 %    Basophils % 0.7 0 - 1 %    Segs Absolute 4.1 K/CU MM    Lymphocytes Absolute 2.5 K/CU MM    Monocytes Absolute 0.6 K/CU MM    Eosinophils Absolute 0.1 K/CU MM    Basophils Absolute 0.1 K/CU MM    Nucleated RBC % 0.0 %    Total Nucleated RBC 0.0 K/CU MM    Total Immature Neutrophil 0.04 K/CU MM    Immature Neutrophil % 0.5 (H) 0 - 0.43 %   CMP   Result Value Ref Range    Sodium 137 135 - 145 MMOL/L    Potassium 3.8 3.5 - 5.1 MMOL/L    Chloride 99 99 - 110 mMol/L    CO2 24 21 - 32 MMOL/L    BUN 19 6 - 23 MG/DL    CREATININE 0.6 0.6 - 1.1 MG/DL    Glucose 108 (H) 70 - 99 MG/DL    Calcium 9.8 8.3 - 10.6 MG/DL    Albumin 5.8 (H) 3.4 - 5.0 GM/DL    Total Protein 8.4 (H) 6.4 - 8.2 GM/DL    Total Bilirubin 0.4 0.0 - 1.0 MG/DL    ALT 26 10 - 40 U/L    AST 24 15 - 37 IU/L    Alkaline Phosphatase 53 40 - 129 IU/L    GFR Non-African American >60 >60 mL/min/1.73m2    GFR African American >60 >60 mL/min/1.73m2    Anion Gap 14 4 - 16   Magnesium   Result Value Ref Range    Magnesium 2.3 1.8 - 2.4 mg/dl   Urinalysis   Result Value Ref Range    Color, UA YELLOW YELLOW    Clarity, UA CLEAR CLEAR    Glucose, Urine NEGATIVE NEGATIVE MG/DL    Bilirubin Urine NEGATIVE NEGATIVE MG/DL    Ketones, Urine NEGATIVE NEGATIVE MG/DL    Specific Gravity, UA 1.019 1.001 - 1.035    Blood, Urine SMALL (A) NEGATIVE    pH, Urine 6.0 5.0 - 8.0    Protein, UA NEGATIVE NEGATIVE MG/DL    Urobilinogen, Urine NEGATIVE 0.2 - 1.0 MG/DL    Nitrite Urine, Quantitative NEGATIVE NEGATIVE    Leukocyte Esterase, Urine NEGATIVE NEGATIVE    RBC, UA 5 0 - 6 /HPF    WBC, UA 1 0 - 5 /HPF    Bacteria, UA RARE (A) NEGATIVE /HPF    Squam Epithel, UA 2 /HPF    Mucus, UA RARE (A) NEGATIVE HPF    Trichomonas, UA NONE SEEN NONE SEEN /HPF        Radiographs (if obtained):  [] The following radiograph was interpreted by myself in the absence of a radiologist:  [x] Radiologist's Report Reviewed:    CT Brain, CTA head/neck    NM THYROID METASTASES SCAN WHOLE BODY    Result Date: 6/23/2021  EXAMINATION: NUCLEAR MEDICINE WHOLE BODY THYROID SCINTIGRAPHY, 6/22/2021 2:32 pm TECHNIQUE: Patient was given 94.0 millicuries I 985 sodium iodide on 06/17/2021 for treatment of thyroid carcinoma. COMPARISON: Whole-body scan dated 06/15/2021 HISTORY: ORDERING SYSTEM PROVIDED HISTORY:  Thyroid ca, C73 TECHNOLOGIST PROVIDED HISTORY: I 131 Whole Body Scan 5 days post  I 131 Treatment Reason for exam:  Thyroid ca, C73 Is the patient pregnant? No Reason for Exam:  Thyroid Ca. 5 days post Tx Acuity: Unknown Type of Exam: Unknown FINDINGS: There is intense uptake in the thyroid bed compatible with residual thyroid tissue. No abnormal activity is seen elsewhere to suggest metastatic disease. No evidence of distant metastases. Residual uptake is seen in the thyroid bed. NM THYROID METASTASES SCAN WHOLE BODY    Result Date: 6/15/2021  EXAMINATION: TOTAL BODY I-123 SCAN HISTORY: ORDERING SYSTEM PROVIDED HISTORY: Thyroid ca TECHNOLOGIST PROVIDED HISTORY: I 123 Diagnostic Dose Day 1 6-14 I 123 Diagnostic Scan Day 2 6-15 Reason for exam:->Thyroid ca Is the patient pregnant?->No Reason for Exam: thryoid cancer Acuity: Unknown Type of Exam: Unknown COMPARISON: None METHOD: The patient received orally 1.8 mCi of I-123 sodium and total body scintigraphy was performed 1 days later. FINDINGS: Focal areas of increased uptake within the neck are present. There was physiologic distribution of radioactivity. Focal areas of increased uptake within the neck may represent residual thyroid tissue and/or thyroid cancer. No iodine avid distant metastatic disease. NM RADIOPHARM THERAPY ORAL    Result Date: 6/17/2021  EXAMINATION: NUCLEAR MEDICINE I-131 THYROID ABLATION. 6/17/2021 11:43 am TECHNIQUE: I 131 therapy for thyroid cancer under the direction of Dr. Jones Board. 21.2 millicuries Na R-663 administered 06/17/2021. COMPARISON: Pre therapy scan 06/15/2021 HISTORY: Thyroid cancer FINDINGS: No images were obtained.      Uneventful I-131 administration for thyroid cancer therapy. EKG (if obtained): (All EKG's are interpreted by myself in the absence of a cardiologist)    MDM:    Patient here with headache, neck pain. No history of thyroid cancer status post surgery and radiation. She did have a headache recently but slightly getting worse. She appears otherwise well she took ibuprofen today with some relief she has some right-sided left-sided neck pain mostly around her lymph nodes on each side of her neck. Her incision appears clean dry intact of her lower neck. She has no carotid bruits she has no stridor no drooling airways and normal she has no neck rigidity her physical and neuro exam otherwise normal her vital signs are stable she is afebrile. Again she has been having these headaches and taking ibuprofen. She says she had a PET scan after her surgery and radiation that was negative. Will give her pain nausea medicine IV fluids will check labs, imaging to rule out dissection, abscess, brain tumor, aneurysm etc. otherwise patient appears remarkably well she is in no distress low suspicion for subarachnoid hemorrhage or meningitis. Patient recheck she is feeling better headache is times before I did offer further treatment options her head scan and neck skin are normal.  She is feeling better she would like to go home will come back if symptoms worsen I did stress need for follow-up I will give her Fioricet, Zofran at this time low suspicion for again meningitis subarachnoid hemorrhage stable. Discharge. She is okay with plan.     CLINICAL IMPRESSION:  Final diagnoses:   Nonintractable headache, unspecified chronicity pattern, unspecified headache type   History of thyroid cancer       (Please note that portions of this note may have been completed with a voice recognition program. Efforts were made to edit the dictations but occasionally words aremis-transcribed.)    DISPOSITION REFERRAL (if applicable):  Brad Meyer, APRN - NP  [address] [facility] [id number]  [phone]    Schedule an appointment as soon as possible for a visit in 1 day      [facility]  [address] [place] [address]  [phone]    If symptoms worsen      DISPOSITION MEDICATIONS (if applicable):  New Prescriptions    BUTALBITAL-APAP-CAFFEINE (FIORICET) -40 MG CAPS PER CAPSULE    Take 1 capsule by mouth every 4 hours as needed for Headaches    ONDANSETRON (ZOFRAN ODT) 4 MG DISINTEGRATING TABLET    Take 1 tablet by mouth every 8 hours as needed for Nausea          Vastech, DO           Vastech, DO  07/10/21 0101

## 2021-07-22 ENCOUNTER — HOSPITAL ENCOUNTER (OUTPATIENT)
Dept: RADIATION ONCOLOGY | Age: 41
Discharge: HOME | End: 2021-07-22
Payer: COMMERCIAL

## 2021-07-22 VITALS
SYSTOLIC BLOOD PRESSURE: 132 MMHG | WEIGHT: 185 LBS | HEART RATE: 82 BPM | TEMPERATURE: 97.5 F | DIASTOLIC BLOOD PRESSURE: 83 MMHG | BODY MASS INDEX: 30.79 KG/M2 | RESPIRATION RATE: 16 BRPM

## 2021-07-22 DIAGNOSIS — C73 MALIGNANT NEOPLASM OF THYROID GLAND (HCC): Primary | ICD-10-CM

## 2021-07-22 PROCEDURE — 99213 OFFICE O/P EST LOW 20 MIN: CPT | Performed by: RADIOLOGY

## 2021-07-22 PROCEDURE — 99211 OFF/OP EST MAY X REQ PHY/QHP: CPT

## 2021-07-22 PROCEDURE — G0463 HOSPITAL OUTPT CLINIC VISIT: HCPCS

## 2021-07-22 RX ORDER — LEVOTHYROXINE SODIUM 0.15 MG/1
TABLET ORAL
COMMUNITY
Start: 2021-06-22 | End: 2021-07-22 | Stop reason: SDUPTHER

## 2021-07-22 RX ORDER — LEVOTHYROXINE SODIUM 0.15 MG/1
150 TABLET ORAL DAILY
Qty: 30 TABLET | Refills: 0 | Status: SHIPPED | OUTPATIENT
Start: 2021-07-22 | End: 2021-08-24

## 2021-07-22 ASSESSMENT — PAIN SCALES - GENERAL: PAINLEVEL_OUTOF10: 4

## 2021-07-22 ASSESSMENT — PAIN DESCRIPTION - LOCATION: LOCATION: NECK

## 2021-07-22 NOTE — PROGRESS NOTES
[facility]  7/22/2021    Patient is seen today for follow up. Vitals:    07/22/21 1138   BP: 132/83   Pulse: 82   Resp: 16   Temp: 97.5 °F (36.4 °C)             Wt Readings from Last 3 Encounters:   07/22/21 185 lb (83.9 kg)   07/09/21 180 lb (81.6 kg)   06/10/21 183 lb 6.4 oz (83.2 kg)       Pain Assessment  Pain Assessment: 0-10  Pain Level: 4  Pain Location: Neck  Denies Need for Intervention     No Known Allergies     Current Outpatient Medications   Medication Sig Dispense Refill    levothyroxine (SYNTHROID) 150 MCG tablet TAKE 1 TABLET BY MOUTH EVERY DAY      butalbital-APAP-caffeine (FIORICET) -40 MG CAPS per capsule Take 1 capsule by mouth every 4 hours as needed for Headaches 84 capsule 0    ondansetron (ZOFRAN ODT) 4 MG disintegrating tablet Take 1 tablet by mouth every 8 hours as needed for Nausea 15 tablet 0    prochlorperazine (COMPAZINE) 10 MG tablet Take 1 tablet by mouth every 6 hours as needed (nausea) 21 tablet 0    Biotin 5000 MCG TABS Take 1 tablet by mouth daily      FIBER ADULT GUMMIES PO Take by mouth      Multiple Vitamins-Calcium (ONE-A-DAY WOMENS FORMULA PO) Take by mouth       No current facility-administered medications for this encounter.         Additional Comments:     Electronically signed by Rosa Leong RN on 7/22/2021 at 11:42 AM

## 2021-07-22 NOTE — PROGRESS NOTES
[address] [facility]  [address] [facility], [address] [facility]  Phone: [phone]  Fax: [phone]    RADIATION ONCOLOGY FOLLOW UP REPORT    PATIENT NAME:  Bindu Hilton              : 1980  MEDICAL RECORD NO: [id number]    [facility] NO: [id number]        PROVIDER: Andrés Stallings MD      DATE OF SERVICE: 2021     FOLLOW UP PHYSICIANS:  Michael Fiore M.D.  [address] [facility], [address] [place] [address]          DIAGNOSIS: Cancer Staging  Malignant neoplasm of thyroid gland [facility])  Staging form: Thyroid - Differentiated, AJCC 8th Edition  - Pathologic: Stage I (pT1b, pN0, cM0, Age at diagnosis: < 54 years) - Signed by Andrés Stallings MD on 3/2/2021         TREATMENT COURSE:   Oncology History   Malignant neoplasm of thyroid gland ([facility] [address]   2021 Initial Diagnosis    Malignant neoplasm of thyroid gland ([facility] [address]     2021 Surgery    Thyroid right lobe and isthmus partial thyroidectomy     2021 Surgery    Thyroid left lobe completion thyroidectomy         HPI:   Bindu Hilton is a 42 y.o. female who has a history as above who underwent I-131 therapy on 21 to receive 52.4 mCi. She returns today for her first posttreatment visit. She was initiated on levothyroxine 150 µg daily. Her post therapy scan showed no evidence of distant metastasis. Currently, the patient reports to be doing well. Energy level has been slowly improving. The patient denies any fevers, chills, or drenching night sweats. Weight and appetite have been stable. Denies any chest pain or shortness of breath. Denies any new lumps or bumps anywhere throughout the body. she complains of bilateral neck soreness as well as bilateral anterior leg soreness. She reports the neck soreness has been occurring since her left thyroid lobe completion thyroidectomy. She reports she has had the bilateral leg pain previously as well. She denies any calf erythema or ankle edema.   The patient has not been experiencing any dysphagia, odynophagia, salivary gland pain, or taste changes. The patient denies any paresthesias. The patient reports compliance with CDC Covid 19 measures      RADIOLOGIC STUDIES:  CT HEAD WO CONTRAST    Result Date: 7/10/2021  EXAMINATION: CTA OF THE HEAD AND NECK WITH CONTRAST; CT OF THE HEAD WITHOUT CONTRAST 7/9/2021 11:58 pm: TECHNIQUE: CTA of the head and neck was performed with the administration of intravenous contrast. Multiplanar reformatted images are provided for review. MIP images are provided for review. Stenosis of the internal carotid arteries measured using NASCET criteria. Dose modulation, iterative reconstruction, and/or weight based adjustment of the mA/kV was utilized to reduce the radiation dose to as low as reasonably achievable.; CT of the head was performed without the administration of intravenous contrast. Dose modulation, iterative reconstruction, and/or weight based adjustment of the mA/kV was utilized to reduce the radiation dose to as low as reasonably achievable. Noncontrast CT of the head with reconstructed 2-D images are also provided for review. COMPARISON: CT head done August 23, 2013. HISTORY: ORDERING SYSTEM PROVIDED HISTORY: headache/sore throat hx of thyroid cancer TECHNOLOGIST PROVIDED HISTORY: Reason for exam:->headache/sore throat hx of thyroid cancer Decision Support Exception - unselect if not a suspected or confirmed emergency medical condition->Emergency Medical Condition (MA) Reason for Exam: migrane; ORDERING SYSTEM PROVIDED HISTORY: headache/hx of thyroid cancer TECHNOLOGIST PROVIDED HISTORY: Has a \"code stroke\" or \"stroke alert\" been called? ->No Reason for exam:->headache/hx of thyroid cancer Decision Support Exception - unselect if not a suspected or confirmed emergency medical condition->Emergency Medical Condition (MA) Is the patient pregnant?->No Reason for Exam: migrane FINDINGS: CT HEAD: BRAIN/VENTRICLES:  No acute intracranial hemorrhage or extraaxial fluid collection. Grey-white differentiation is maintained. No evidence of mass, mass effect or midline shift. No evidence of hydrocephalus. ORBITS: The visualized portion of the orbits demonstrate no acute abnormality. SINUSES:  The visualized paranasal sinuses and mastoid air cells demonstrate no acute abnormality. SOFT TISSUES/SKULL: No acute abnormality of the visualized skull or soft tissues. CTA NECK: AORTIC ARCH/ARCH VESSELS: No dissection or arterial injury. No significant stenosis of the brachiocephalic or subclavian arteries. CAROTID ARTERIES: No dissection, arterial injury, or hemodynamically significant stenosis by NASCET criteria. VERTEBRAL ARTERIES: No dissection, arterial injury, or significant stenosis. SOFT TISSUES: The lung apices are clear. No cervical or superior mediastinal lymphadenopathy. The larynx and pharynx are unremarkable. No acute abnormality of the salivary glands. The thyroid gland is not definitely seen. Nonspecific stranding in the superficial soft tissues of the anterior neck. BONES: No acute osseous abnormality. CTA HEAD: ANTERIOR CIRCULATION: No significant stenosis of the intracranial internal carotid, anterior cerebral, or middle cerebral arteries. No aneurysm. POSTERIOR CIRCULATION: No significant stenosis of the vertebral, basilar, or posterior cerebral arteries. No aneurysm. OTHER: No dural venous sinus thrombosis on this non-dedicated study     1. No acute intracranial abnormality. 2. Unremarkable CTA of the head and neck. NM THYROID METASTASES SCAN WHOLE BODY    Result Date: 6/23/2021  EXAMINATION: NUCLEAR MEDICINE WHOLE BODY THYROID SCINTIGRAPHY, 6/22/2021 2:32 pm TECHNIQUE: Patient was given 25.6 millicuries I 563 sodium iodide on 06/17/2021 for treatment of thyroid carcinoma.  COMPARISON: Whole-body scan dated 06/15/2021 HISTORY: ORDERING SYSTEM PROVIDED HISTORY:  Thyroid ca, C73 TECHNOLOGIST PROVIDED HISTORY: I 131 Whole EXAMINATION:   There were no vitals filed for this visit. The patient is in no acute distress. Neck: Supple no preauricular postauricular, submental, submandibular, cervical, supraclavicular, or infraclavicular lymphadenopathy is present. The thyroid bed is without masses or areas of nodularity. Heart: Regular rate and rhythm. No murmurs, clicks, or gallops are present. Lungs: Bilaterally clear to auscultation. No rales, rhonchi, or wheezing are present. Abdomen: Soft, nontender and nondistended. No hepatosplenomegaly or masses are appreciated. Extremities: No cyanosis, clubbing, or edema is present. Sensation to light touch is intact and symmetric bilaterally in the fingers and feet. ASSESSMENT AND PLAN:     Anya Lauren is a 42 y.o. female who has a history of differentiated thyroid cancer who is now approximately 1 month after completion of I-131 therapy who is doing well at this time recovering from the acute side effects of I-131 therapy. I will obtain a repeat TSH in 2 weeks along with a complex metabolic panel. If unremarkable, The patient is to return to see me in 6 months with labs prior or to return sooner as needed. The patient is to continue to follow CDC's Covid 19 precautionary measures.       Electronically signed by Tu Earl MD on 7/22/2021 at 11:25 AM

## 2021-08-05 ENCOUNTER — HOSPITAL ENCOUNTER (OUTPATIENT)
Dept: INFUSION THERAPY | Age: 41
Discharge: HOME | End: 2021-08-05
Payer: COMMERCIAL

## 2021-08-05 DIAGNOSIS — C73 MALIGNANT NEOPLASM OF THYROID GLAND (HCC): ICD-10-CM

## 2021-08-05 LAB
ALBUMIN SERPL-MCNC: 4.7 GM/DL (ref 3.4–5)
ALP BLD-CCNC: 46 IU/L (ref 40–129)
ALT SERPL-CCNC: 20 U/L (ref 10–40)
ANION GAP SERPL CALCULATED.3IONS-SCNC: 9 MMOL/L (ref 4–16)
AST SERPL-CCNC: 16 IU/L (ref 15–37)
BILIRUB SERPL-MCNC: 0.2 MG/DL (ref 0–1)
BUN BLDV-MCNC: 11 MG/DL (ref 6–23)
CALCIUM SERPL-MCNC: 8.9 MG/DL (ref 8.3–10.6)
CHLORIDE BLD-SCNC: 106 MMOL/L (ref 99–110)
CO2: 25 MMOL/L (ref 21–32)
CREAT SERPL-MCNC: 0.7 MG/DL (ref 0.6–1.1)
GFR AFRICAN AMERICAN: >60 ML/MIN/1.73M2
GFR NON-AFRICAN AMERICAN: >60 ML/MIN/1.73M2
GLUCOSE BLD-MCNC: 121 MG/DL (ref 70–99)
POTASSIUM SERPL-SCNC: 3.9 MMOL/L (ref 3.5–5.1)
SODIUM BLD-SCNC: 140 MMOL/L (ref 135–145)
TOTAL PROTEIN: 6.6 GM/DL (ref 6.4–8.2)
TSH HIGH SENSITIVITY: 1.17 UIU/ML (ref 0.27–4.2)

## 2021-08-05 PROCEDURE — 36415 COLL VENOUS BLD VENIPUNCTURE: CPT

## 2021-08-05 PROCEDURE — 80053 COMPREHEN METABOLIC PANEL: CPT

## 2021-08-05 PROCEDURE — 84443 ASSAY THYROID STIM HORMONE: CPT

## 2021-08-24 RX ORDER — LEVOTHYROXINE SODIUM 175 UG/1
175 TABLET ORAL DAILY
Qty: 30 TABLET | Refills: 1 | Status: SHIPPED | OUTPATIENT
Start: 2021-08-24 | End: 2021-10-14 | Stop reason: SDUPTHER

## 2021-08-30 ENCOUNTER — TELEPHONE (OUTPATIENT)
Dept: RADIATION ONCOLOGY | Age: 41
End: 2021-08-30

## 2021-08-30 DIAGNOSIS — C73 MALIGNANT NEOPLASM OF THYROID GLAND (HCC): ICD-10-CM

## 2021-08-30 DIAGNOSIS — C73 MALIGNANT NEOPLASM OF THYROID GLAND (HCC): Primary | ICD-10-CM

## 2021-08-30 NOTE — TELEPHONE ENCOUNTER
----- Message from Ermias Navarro MD sent at 8/24/2021 11:30 AM EDT -----  Please notify patient that their lab results are improved. Will increase levothyroxine to 175mcg/d. Notified pt of above. Pt to get repeat labs in 6 weeks. Appt made  for 10- at 2:15 at [facility]. Pt advised to call with any questions or concerns, voices understanding of above.

## 2021-10-13 ENCOUNTER — HOSPITAL ENCOUNTER (OUTPATIENT)
Dept: INFUSION THERAPY | Age: 41
Discharge: HOME | End: 2021-10-13
Payer: COMMERCIAL

## 2021-10-13 DIAGNOSIS — C73 MALIGNANT NEOPLASM OF THYROID GLAND (HCC): ICD-10-CM

## 2021-10-13 LAB — TSH HIGH SENSITIVITY: 0.01 UIU/ML (ref 0.27–4.2)

## 2021-10-13 PROCEDURE — 84443 ASSAY THYROID STIM HORMONE: CPT

## 2021-10-13 PROCEDURE — 36415 COLL VENOUS BLD VENIPUNCTURE: CPT

## 2021-10-14 RX ORDER — LEVOTHYROXINE SODIUM 175 UG/1
175 TABLET ORAL DAILY
Qty: 90 TABLET | Refills: 1 | Status: SHIPPED | OUTPATIENT
Start: 2021-10-14 | End: 2022-01-06

## 2021-10-15 ENCOUNTER — TELEPHONE (OUTPATIENT)
Dept: RADIATION ONCOLOGY | Age: 41
End: 2021-10-15

## 2021-10-15 DIAGNOSIS — C73 MALIGNANT NEOPLASM OF THYROID GLAND (HCC): ICD-10-CM

## 2021-10-15 NOTE — TELEPHONE ENCOUNTER
----- Message from Priya Pineda MD sent at 10/14/2021  5:09 PM EDT -----  Please notify patient that their lab results are good. Cont current dosing. This RN discussed pt's fatigue and leg pain with , pt advised to follow up with PCP or make appt to see . Spoke to pt, above info given. Pt voices understanding but wishes to speak to  regarding fatigue, continued leg pain and TSH result. Appointment made for Oct 26,2021 at 12:40, pt advised to call with further needs or to go to ER if becomes more severe. Pt voices understanding.

## 2021-10-25 ENCOUNTER — HOSPITAL ENCOUNTER (OUTPATIENT)
Dept: RADIATION ONCOLOGY | Age: 41
Discharge: HOME | End: 2021-10-25
Payer: COMMERCIAL

## 2021-10-25 VITALS
HEIGHT: 65 IN | SYSTOLIC BLOOD PRESSURE: 138 MMHG | HEART RATE: 82 BPM | BODY MASS INDEX: 30.86 KG/M2 | TEMPERATURE: 97.7 F | WEIGHT: 185.2 LBS | RESPIRATION RATE: 16 BRPM | DIASTOLIC BLOOD PRESSURE: 78 MMHG | OXYGEN SATURATION: 97 %

## 2021-10-25 DIAGNOSIS — C73 MALIGNANT NEOPLASM OF THYROID GLAND (HCC): ICD-10-CM

## 2021-10-25 PROCEDURE — 99211 OFF/OP EST MAY X REQ PHY/QHP: CPT

## 2021-10-25 PROCEDURE — 99212 OFFICE O/P EST SF 10 MIN: CPT | Performed by: RADIOLOGY

## 2021-10-25 RX ORDER — PHENTERMINE HYDROCHLORIDE 15 MG/1
15 CAPSULE ORAL EVERY MORNING
COMMUNITY
End: 2022-06-14

## 2021-10-25 ASSESSMENT — PAIN SCALES - GENERAL: PAINLEVEL_OUTOF10: 0

## 2021-10-25 NOTE — PROGRESS NOTES
[id number] [facility] [place], [address] [facility]  Phone: [phone]  Fax: [phone]    RADIATION ONCOLOGY FOLLOW UP REPORT    PATIENT NAME:  Mike Orantes              : 1980  MEDICAL RECORD NO: [id number]    [facility] NO: [id number]        PROVIDER: Darryn Lambert MD      DATE OF SERVICE: 10/25/2021     FOLLOW UP PHYSICIANS:  Candelaria Stevens M.D.  [address] [place] [address]          DIAGNOSIS: Cancer Staging  Malignant neoplasm of thyroid gland [facility])  Staging form: Thyroid - Differentiated, AJCC 8th Edition  - Pathologic: Stage I (pT1b, pN0, cM0, Age at diagnosis: < 54 years) - Signed by Darryn Lambert MD on 3/2/2021         TREATMENT COURSE:   Oncology History   Malignant neoplasm of thyroid gland ([facility] [address]   2021 Initial Diagnosis    Malignant neoplasm of thyroid gland ([facility] [address]     2021 Surgery    Thyroid right lobe and isthmus partial thyroidectomy     2021 Surgery    Thyroid left lobe completion thyroidectomy     2021 -  Radiation    I 131 Therapy: 52. 4mCi         HPI:   Mike Orantes is a 42 y.o. female who has a history of above of a stage I differentiated thyroid cancer status post thyroidectomy and I-131 therapy who presents today with the complaints of persistent anterior thigh and shin soreness. She reports the symptoms have been ongoing since her thyroidectomy without progression or changes when her levothyroxine was adjusted from 150 mcg daily to 175 mcg daily. She describes them as a mild chronic soreness. She denies any shooting pains or associated numbness. She does get occasional leg cramps at night in her calves. CMP in August was unremarkable. She is taking a women's once a day multivitamin and eating bananas. Her most recent TSH from 10/13/2021 was 0.014U IU per mL. Currently, the patient reports to be doing well.   Energy level has been low, but not substantially changed with her levothyroxine dose adjustment. She has not noticed any new lumps or bumps anywhere throughout her body. She denies any recent running/jogging. She denies any leg or ankle swelling or erythema. The patient reports compliance with CDC Covid 19 measures      LABORATORY STUDIES:   CBC:   Lab Results   Component Value Date    WBC 7.4 07/09/2021    RBC 4.62 07/09/2021    HGB 14.6 07/09/2021    HCT 41.9 07/09/2021    MCV 90.7 07/09/2021    MCH 31.6 07/09/2021    MCHC 34.8 07/09/2021    RDW 12.1 07/09/2021     07/09/2021    MPV 9.4 07/09/2021     CMP:  Lab Results   Component Value Date     08/05/2021    K 3.9 08/05/2021     08/05/2021    CO2 25 08/05/2021    BUN 11 08/05/2021    CREATININE 0.7 08/05/2021    GFRAA >60 08/05/2021    LABGLOM >60 08/05/2021    GLUCOSE 121 08/05/2021    PROT 6.6 08/05/2021    PROT 7.0 07/12/2011    LABALBU 4.7 08/05/2021    CALCIUM 8.9 08/05/2021    BILITOT 0.2 08/05/2021    ALKPHOS 46 08/05/2021    AST 16 08/05/2021    ALT 20 08/05/2021     Onc labs: No results found for: CEA, LDH, AFP  10/13/2021  4:56 PM - [facility] Incoming Lab Results From [employer] [facility]    Component Value Ref Range & Units Status Collected Lab   TSH, High Sensitivity 0.014Low   0.270 - 4.20 uIu/ml Final 10/13/2021  1:50 PM      8/5/2021  5:28 PM - [facility] Incoming Lab Results From [employer] [facility]    Component Value Ref Range & Units Status Collected Lab   TSH, High Sensitivity 1.170  0.270 - 4.20 uIu/ml Final 08/05/2021  2:09 PM        MEDICATIONS:   Current Outpatient Medications   Medication Sig Dispense Refill    phentermine 15 MG capsule Take 15 mg by mouth every morning.       levothyroxine (SYNTHROID) 175 MCG tablet Take 1 tablet by mouth Daily for 180 doses 90 tablet 1    butalbital-APAP-caffeine (FIORICET) -40 MG CAPS per capsule Take 1 capsule by mouth every 4 hours as needed for Headaches 84 capsule 0    ondansetron (ZOFRAN ODT) 4 MG disintegrating tablet Take 1 tablet by mouth every 8 hours as needed for Nausea 15 tablet 0    Biotin 5000 MCG TABS Take 1 tablet by mouth daily      FIBER ADULT GUMMIES PO Take by mouth      Multiple Vitamins-Calcium (ONE-A-DAY WOMENS FORMULA PO) Take by mouth      prochlorperazine (COMPAZINE) 10 MG tablet Take 1 tablet by mouth every 6 hours as needed (nausea) 21 tablet 0     No current facility-administered medications for this encounter. EXAMINATION:   Vitals:    10/25/21 1405   BP: 138/78   Pulse: 82   Resp: 16   Temp: 97.7 °F (36.5 °C)   SpO2: 97%     The patient is in no acute distress. Extremities: No cyanosis, clubbing, or edema is present. Sensation to light touch is intact and symmetric bilaterally in the lower extremities. No focal tenderness elicited through the thighs or shins no palpable masses in the thighs or shins. ASSESSMENT AND PLAN:     Dandre Benito is a 42 y.o. female who has a history of a stage I differentiated thyroid cancer who is now approximately 4 months after completion of 131 therapy who is doing well at this time without evidence of recurrent or metastatic disease. The patient is to return to see me in January 2022 as scheduled with labs prior, or to return sooner as needed. She is to follow-up with her primary care physician regarding her anterior thigh/shin soreness. The patient is to continue to follow CDC's Covid 19 precautionary measures.       Electronically signed by Scotty Rosario MD on 10/25/2021 at 3:43 PM

## 2021-10-25 NOTE — PROGRESS NOTES
Chelo COTTON [employer]  10/25/2021    Patient is seen today for follow up. Vitals:    10/25/21 1405   BP: 138/78   Pulse: 82   Resp: 16   Temp: 97.7 °F (36.5 °C)   SpO2: 97%        Oxygen Therapy  SpO2: 97 %  Pulse Oximeter Device Mode: Intermittent  Pulse Oximeter Device Location: Right, Finger  O2 Device: None (Room air)    Wt Readings from Last 3 Encounters:   10/25/21 185 lb 3.2 oz (84 kg)   07/22/21 185 lb (83.9 kg)   07/09/21 180 lb (81.6 kg)       Pain Assessment  Pain Assessment: 0-10  Pain Level: 0  Denies Need for Intervention     No Known Allergies     Current Outpatient Medications   Medication Sig Dispense Refill    phentermine 15 MG capsule Take 15 mg by mouth every morning.  levothyroxine (SYNTHROID) 175 MCG tablet Take 1 tablet by mouth Daily for 180 doses 90 tablet 1    butalbital-APAP-caffeine (FIORICET) -40 MG CAPS per capsule Take 1 capsule by mouth every 4 hours as needed for Headaches 84 capsule 0    ondansetron (ZOFRAN ODT) 4 MG disintegrating tablet Take 1 tablet by mouth every 8 hours as needed for Nausea 15 tablet 0    Biotin 5000 MCG TABS Take 1 tablet by mouth daily      FIBER ADULT GUMMIES PO Take by mouth      Multiple Vitamins-Calcium (ONE-A-DAY WOMENS FORMULA PO) Take by mouth      prochlorperazine (COMPAZINE) 10 MG tablet Take 1 tablet by mouth every 6 hours as needed (nausea) 21 tablet 0     No current facility-administered medications for this encounter. Additional Comments: Patient here for a follow up with Dr. Amarilis Aguila.     Electronically signed by Miquel Read on 10/25/2021 at 2:12 PM

## 2021-10-26 ENCOUNTER — APPOINTMENT (OUTPATIENT)
Dept: RADIATION ONCOLOGY | Age: 41
End: 2021-10-26
Payer: COMMERCIAL

## 2021-12-29 ENCOUNTER — HOSPITAL ENCOUNTER (EMERGENCY)
Age: 41
Discharge: HOME | End: 2021-12-29
Attending: EMERGENCY MEDICINE
Payer: COMMERCIAL

## 2021-12-29 VITALS
DIASTOLIC BLOOD PRESSURE: 101 MMHG | HEART RATE: 75 BPM | SYSTOLIC BLOOD PRESSURE: 141 MMHG | OXYGEN SATURATION: 95 % | TEMPERATURE: 97.6 F | RESPIRATION RATE: 18 BRPM

## 2021-12-29 DIAGNOSIS — R42 LIGHTHEADEDNESS: Primary | ICD-10-CM

## 2021-12-29 LAB
ANION GAP SERPL CALCULATED.3IONS-SCNC: 13 MMOL/L (ref 4–16)
BASOPHILS ABSOLUTE: 0 K/CU MM
BASOPHILS RELATIVE PERCENT: 0.2 % (ref 0–1)
BUN BLDV-MCNC: 15 MG/DL (ref 6–23)
CALCIUM SERPL-MCNC: 9.6 MG/DL (ref 8.3–10.6)
CHLORIDE BLD-SCNC: 105 MMOL/L (ref 99–110)
CHP ED QC CHECK: YES
CO2: 24 MMOL/L (ref 21–32)
CREAT SERPL-MCNC: 0.6 MG/DL (ref 0.6–1.1)
DIFFERENTIAL TYPE: ABNORMAL
EOSINOPHILS ABSOLUTE: 0.1 K/CU MM
EOSINOPHILS RELATIVE PERCENT: 0.5 % (ref 0–3)
GFR AFRICAN AMERICAN: >60 ML/MIN/1.73M2
GFR NON-AFRICAN AMERICAN: >60 ML/MIN/1.73M2
GLUCOSE BLD-MCNC: 115 MG/DL
GLUCOSE BLD-MCNC: 115 MG/DL (ref 70–99)
GLUCOSE BLD-MCNC: 127 MG/DL (ref 70–99)
HCT VFR BLD CALC: 42.9 % (ref 37–47)
HEMOGLOBIN: 14.4 GM/DL (ref 12.5–16)
IMMATURE NEUTROPHIL %: 0.2 % (ref 0–0.43)
LYMPHOCYTES ABSOLUTE: 1.6 K/CU MM
LYMPHOCYTES RELATIVE PERCENT: 16.6 % (ref 24–44)
MCH RBC QN AUTO: 30.1 PG (ref 27–31)
MCHC RBC AUTO-ENTMCNC: 33.6 % (ref 32–36)
MCV RBC AUTO: 89.7 FL (ref 78–100)
MONOCYTES ABSOLUTE: 0.5 K/CU MM
MONOCYTES RELATIVE PERCENT: 5.5 % (ref 0–4)
NUCLEATED RBC %: 0 %
PDW BLD-RTO: 11.1 % (ref 11.7–14.9)
PLATELET # BLD: 323 K/CU MM (ref 140–440)
PMV BLD AUTO: 9.4 FL (ref 7.5–11.1)
POTASSIUM SERPL-SCNC: 4 MMOL/L (ref 3.5–5.1)
RBC # BLD: 4.78 M/CU MM (ref 4.2–5.4)
SEGMENTED NEUTROPHILS ABSOLUTE COUNT: 7.5 K/CU MM
SEGMENTED NEUTROPHILS RELATIVE PERCENT: 77 % (ref 36–66)
SODIUM BLD-SCNC: 142 MMOL/L (ref 135–145)
TOTAL IMMATURE NEUTOROPHIL: 0.02 K/CU MM
TOTAL NUCLEATED RBC: 0 K/CU MM
WBC # BLD: 9.8 K/CU MM (ref 4–10.5)

## 2021-12-29 PROCEDURE — 80048 BASIC METABOLIC PNL TOTAL CA: CPT

## 2021-12-29 PROCEDURE — 82962 GLUCOSE BLOOD TEST: CPT

## 2021-12-29 PROCEDURE — 99284 EMERGENCY DEPT VISIT MOD MDM: CPT

## 2021-12-29 PROCEDURE — 2580000003 HC RX 258: Performed by: EMERGENCY MEDICINE

## 2021-12-29 PROCEDURE — 85025 COMPLETE CBC W/AUTO DIFF WBC: CPT

## 2021-12-29 PROCEDURE — 93005 ELECTROCARDIOGRAM TRACING: CPT | Performed by: EMERGENCY MEDICINE

## 2021-12-29 RX ORDER — 0.9 % SODIUM CHLORIDE 0.9 %
1000 INTRAVENOUS SOLUTION INTRAVENOUS ONCE
Status: COMPLETED | OUTPATIENT
Start: 2021-12-29 | End: 2021-12-29

## 2021-12-29 RX ADMIN — SODIUM CHLORIDE 1000 ML: 9 INJECTION, SOLUTION INTRAVENOUS at 19:17

## 2021-12-29 NOTE — ED PROVIDER NOTES
Emergency Department Encounter    Patient: Mike Orantes  MRN: [id number]  : 1980  Date of Evaluation: 2021  ED Provider:  Leida Colmenares MD    Triage Chief Complaint:   Other (Rapid response from floor; feels lightheaded )    [place]:  Mike Orantes is a 42 y.o. female that presents with complaint of lightehadedness. With complaint of feeling lightheaded. She was checking the patient's blood glucose, she was walking to the sink, suddenly felt sweaty and lightheaded like she might fall, she leaned against the sink. She did go out to the nursing station, they checked her blood sugar, states it was in the low 100s. She does have a history of thyroid cancer, in remission. No CP or SOB. No weakness or numbness in extremities. No trauma or injury. No fevers or recent illness, no nausea or vomiting. Currently just feels tired. ROS - see HPI, below listed is current ROS at time of my eval:  10 systems reviewed and negative except as above. Past Medical History:   Diagnosis Date    Arthritis     Cancer ([address]     Hiatal hernia     Hyperlipidemia     IBS (irritable bowel syndrome)     Irregular heart beat     Migraine      Past Surgical History:   Procedure Laterality Date     SECTION      CHOLECYSTECTOMY      DILATION AND CURETTAGE OF UTERUS      ENDOMETRIAL ABLATION      HYSTERECTOMY      TUBAL LIGATION       History reviewed. No pertinent family history.   Social History     Socioeconomic History    Marital status:      Spouse name: Not on file    Number of children: Not on file    Years of education: Not on file    Highest education level: Not on file   Occupational History    Not on file   Tobacco Use    Smoking status: Current Every Day Smoker     Packs/day: 0.25    Smokeless tobacco: Never Used   Substance and Sexual Activity    Alcohol use: No    Drug use: No    Sexual activity: Not on file   Other Topics Concern    Not on file   Social History Narrative  Not on file     Social Determinants of Health     Financial Resource Strain:     Difficulty of Paying Living Expenses: Not on file   Food Insecurity:     Worried About Running Out of Food in the Last Year: Not on file    Aren of Food in the Last Year: Not on file   Transportation Needs:     Lack of Transportation (Medical): Not on file    Lack of Transportation (Non-Medical): Not on file   Physical Activity:     Days of Exercise per Week: Not on file    Minutes of Exercise per Session: Not on file   Stress:     Feeling of Stress : Not on file   Social Connections:     Frequency of Communication with Friends and Family: Not on file    Frequency of Social Gatherings with Friends and Family: Not on file    Attends Druze Services: Not on file    Active Member of [address] [place] [address] [employer] or Organizations: Not on file    Attends Club or Organization Meetings: Not on file    Marital Status: Not on file   Intimate Partner Violence:     Fear of Current or Ex-Partner: Not on file    Emotionally Abused: Not on file    Physically Abused: Not on file    Sexually Abused: Not on file   Housing Stability:     Unable to Pay for Housing in the Last Year: Not on file    Number of [place] in the Last Year: Not on file    Unstable Housing in the Last Year: Not on file     No current facility-administered medications for this encounter. Current Outpatient Medications   Medication Sig Dispense Refill    phentermine 15 MG capsule Take 15 mg by mouth every morning.       levothyroxine (SYNTHROID) 175 MCG tablet Take 1 tablet by mouth Daily for 180 doses 90 tablet 1    butalbital-APAP-caffeine (FIORICET) -40 MG CAPS per capsule Take 1 capsule by mouth every 4 hours as needed for Headaches 84 capsule 0    ondansetron (ZOFRAN ODT) 4 MG disintegrating tablet Take 1 tablet by mouth every 8 hours as needed for Nausea 15 tablet 0    prochlorperazine (COMPAZINE) 10 MG tablet Take 1 tablet by mouth every 6 hours as needed (nausea) 21 tablet 0    Biotin 5000 MCG TABS Take 1 tablet by mouth daily      FIBER ADULT GUMMIES PO Take by mouth      Multiple Vitamins-Calcium (ONE-A-DAY WOMENS FORMULA PO) Take by mouth       No Known Allergies    Nursing Notes Reviewed    Physical Exam:  Triage VS:    ED Triage Vitals [12/29/21 8308]   Enc Vitals Group      BP (!) 141/101      Pulse 75      Resp 18      Temp 97.6 °F (36.4 °C)      Temp Source Oral      SpO2 95 %      Weight       Height       Head Circumference       Peak Flow       Pain Score       Pain Loc       Pain Edu? Excl. in [address]? My pulse ox interpretation is - normal    General appearance:  No acute distress. Skin:  Warm. Dry. Eye:  Extraocular movements intact. Ears, nose, mouth and throat:  Oral mucosa moist   Neck:  Trachea midline. Extremity:  No swelling. Normal ROM     Heart:  Regular rate and rhythm, normal S1 & S2, no extra heart sounds. Perfusion:  intact  Respiratory:  Lungs clear to auscultation bilaterally. Respirations nonlabored. Abdominal:   Soft. Nontender. Non distended. Neurological:  Alert and oriented. Cranial nerves II through XII grossly intact, strength 5 out of 5 bilateral upper and lower extremities, sensation intact light touch in bilateral upper and lower extremities. No truncal ataxia with sitting up in the bed. Deep tendon reflexes intact at bilateral patella.   No clonus or rigidity          Psychiatric:  Appropriate    I have reviewed and interpreted all of the currently available lab results from this visit (if applicable):  Results for orders placed or performed during the hospital encounter of 12/29/21   CBC Auto Differential   Result Value Ref Range    WBC 9.8 4.0 - 10.5 K/CU MM    RBC 4.78 4.2 - 5.4 M/CU MM    Hemoglobin 14.4 12.5 - 16.0 GM/DL    Hematocrit 42.9 37 - 47 %    MCV 89.7 78 - 100 FL    MCH 30.1 27 - 31 PG    MCHC 33.6 32.0 - 36.0 %    RDW 11.1 (L) 11.7 - 14.9 %    Platelets 984 055 - 879 K/CU MM    MPV 9.4 7.5 - 11.1 FL    Differential Type AUTOMATED DIFFERENTIAL     Segs Relative 77.0 (H) 36 - 66 %    Lymphocytes % 16.6 (L) 24 - 44 %    Monocytes % 5.5 (H) 0 - 4 %    Eosinophils % 0.5 0 - 3 %    Basophils % 0.2 0 - 1 %    Segs Absolute 7.5 K/CU MM    Lymphocytes Absolute 1.6 K/CU MM    Monocytes Absolute 0.5 K/CU MM    Eosinophils Absolute 0.1 K/CU MM    Basophils Absolute 0.0 K/CU MM    Nucleated RBC % 0.0 %    Total Nucleated RBC 0.0 K/CU MM    Total Immature Neutrophil 0.02 K/CU MM    Immature Neutrophil % 0.2 0 - 0.43 %   BMP   Result Value Ref Range    Sodium 142 135 - 145 MMOL/L    Potassium 4.0 3.5 - 5.1 MMOL/L    Chloride 105 99 - 110 mMol/L    CO2 24 21 - 32 MMOL/L    Anion Gap 13 4 - 16    BUN 15 6 - 23 MG/DL    CREATININE 0.6 0.6 - 1.1 MG/DL    Glucose 127 (H) 70 - 99 MG/DL    Calcium 9.6 8.3 - 10.6 MG/DL    GFR Non-African American >60 >60 mL/min/1.73m2    GFR African American >60 >60 mL/min/1.73m2   POC Blood Glucose   Result Value Ref Range    Glucose 115 mg/dL    QC OK? yes    POCT Glucose   Result Value Ref Range    POC Glucose 115 (H) 70 - 99 MG/DL   EKG 12 Lead   Result Value Ref Range    Ventricular Rate 81 BPM    Atrial Rate 81 BPM    P-R Interval 156 ms    QRS Duration 86 ms    Q-T Interval 390 ms    QTc Calculation (Bazett) 453 ms    P Axis 61 degrees    R Axis 15 degrees    T Axis 50 degrees    Diagnosis       Normal sinus rhythm  Possible Left atrial enlargement  Borderline ECG  No previous ECGs available        Radiographs (if obtained):  Radiologist's Report Reviewed:  No results found. EKG (if obtained): (All EKG's are interpreted by myself in the absence of a cardiologist)  Normal sinus rhythm with rate of 81 bpm, normal intervals. No ST elevation. No previous to compare      MDM:  50-year-old female with history as above presents with concern for feeling lightheaded, near syncope.   She is in no acute distress, Accu-Chek here is normal.  She did feel slightly lightheaded with sitting up in the bed, I ordered a bolus of fluids, we will check basic chemistry and CBC. She has had a hysterectomy, no chance of pregnancy. She has not had any bleeding or trauma. She has not had any chest pain or shortness of breath. Her neurologic exam is normal.    Her CBC and CMP are unremarkable, she is in no distress, after fluids was feeling better, just tired. Discussed possible orthostatic hypotension, possible that she had been on her feet all day and so just had an episode of lightheadedness. She has had to wear a telemetry monitor in the past, she is also had an echocardiogram 7 or 8 years ago. I encouraged close follow-up with PCP for further evaluation as necessary, if develops any chest pain or shortness of breath or recurrence of symptoms should return to the emergency department. She is comfortable with this plan, all questions answered, discharged in stable condition    Clinical Impression:  1. Lightheadedness      Disposition referral (if applicable):  GARRY Ryan - NP  [address] [facility]  [facility] [id number]  [phone]    If symptoms worsen    Disposition medications (if applicable):  Discharge Medication List as of 12/29/2021  8:15 PM        ED Provider Disposition Time  DISPOSITION Decision To Discharge 12/29/2021 08:00:49 PM      Comment: Please note this report has been produced using speech recognition software and may contain errors related to that system including errors in grammar, punctuation, and spelling, as well as words and phrases that may be inappropriate. Efforts were made to edit the dictations.         Cruzita Duverney, MD  12/29/21 2127

## 2021-12-29 NOTE — ED NOTES
Bed: ED-12  Expected date:   Expected time:   Means of arrival:   Comments:  Rapid response from 4th floor     Farnaz Espinosa RN  12/29/21 6595

## 2021-12-30 LAB
EKG ATRIAL RATE: 81 BPM
EKG DIAGNOSIS: NORMAL
EKG P AXIS: 61 DEGREES
EKG P-R INTERVAL: 156 MS
EKG Q-T INTERVAL: 390 MS
EKG QRS DURATION: 86 MS
EKG QTC CALCULATION (BAZETT): 453 MS
EKG R AXIS: 15 DEGREES
EKG T AXIS: 50 DEGREES
EKG VENTRICULAR RATE: 81 BPM

## 2021-12-30 PROCEDURE — 93010 ELECTROCARDIOGRAM REPORT: CPT | Performed by: INTERNAL MEDICINE

## 2021-12-30 NOTE — ED NOTES
Discharge instructions reviewed with pt. All questions answered at this time. Pt verbalized understanding.       Yara Salinas RN  12/29/21 2042

## 2022-01-06 DIAGNOSIS — C73 MALIGNANT NEOPLASM OF THYROID GLAND (HCC): Primary | ICD-10-CM

## 2022-01-06 RX ORDER — LEVOTHYROXINE SODIUM 0.15 MG/1
150 TABLET ORAL DAILY
Qty: 30 TABLET | Refills: 1 | Status: SHIPPED | OUTPATIENT
Start: 2022-01-06 | End: 2022-02-24

## 2022-01-07 ENCOUNTER — TELEPHONE (OUTPATIENT)
Dept: RADIATION ONCOLOGY | Age: 42
End: 2022-01-07

## 2022-01-07 DIAGNOSIS — C73 MALIGNANT NEOPLASM OF THYROID GLAND (HCC): ICD-10-CM

## 2022-01-07 NOTE — TELEPHONE ENCOUNTER
Return call placed to pt regarding recent lab results and c/o feeling fatigued with episodes of light headedness. Pt reports recent ER visit and PCP follow up with lab work. Labs obtain from [facility] and Dr. Marla Trujillo reviewed. Pt advised to decrease Levothyroxine to 150mcg daily and repeat labs in 6 weeks. Pt reports has follow up 1/27 with Dr. Marla Trujillo and would like to keep that appt to discuss plan of care with physician. Pt advised to call office if further complaints or concerns, voices understanding of above.

## 2022-01-27 ENCOUNTER — HOSPITAL ENCOUNTER (OUTPATIENT)
Dept: RADIATION ONCOLOGY | Age: 42
Discharge: HOME | End: 2022-01-27
Payer: COMMERCIAL

## 2022-01-27 VITALS
BODY MASS INDEX: 30.09 KG/M2 | DIASTOLIC BLOOD PRESSURE: 76 MMHG | OXYGEN SATURATION: 97 % | TEMPERATURE: 97.3 F | HEART RATE: 92 BPM | SYSTOLIC BLOOD PRESSURE: 152 MMHG | WEIGHT: 180.6 LBS | RESPIRATION RATE: 16 BRPM | HEIGHT: 65 IN

## 2022-01-27 DIAGNOSIS — C73 MALIGNANT NEOPLASM OF THYROID GLAND (HCC): ICD-10-CM

## 2022-01-27 PROCEDURE — 99213 OFFICE O/P EST LOW 20 MIN: CPT | Performed by: RADIOLOGY

## 2022-01-27 RX ORDER — ATORVASTATIN CALCIUM 20 MG/1
1 TABLET, FILM COATED ORAL DAILY
COMMUNITY
Start: 2022-01-04

## 2022-01-27 ASSESSMENT — PAIN SCALES - GENERAL: PAINLEVEL_OUTOF10: 5

## 2022-01-27 ASSESSMENT — PAIN DESCRIPTION - LOCATION: LOCATION: NECK

## 2022-01-27 NOTE — PROGRESS NOTES
Chelo Peters  1/27/2022    Patient is seen today for follow up. Vitals:    01/27/22 1423   BP: (!) 152/76   Pulse: 92   Resp: 16   Temp: 97.3 °F (36.3 °C)   SpO2: 97%        Oxygen Therapy  SpO2: 97 %  Pulse Oximeter Device Mode: Intermittent  Pulse Oximeter Device Location: Left,Finger  O2 Device: None (Room air)  Skin Assessment: Clean, dry, & intact    Wt Readings from Last 3 Encounters:   01/27/22 180 lb 9.6 oz (81.9 kg)   10/25/21 185 lb 3.2 oz (84 kg)   07/22/21 185 lb (83.9 kg)       Pain Assessment  Pain Assessment: 0-10  Pain Level: 5  Pain Location: Neck  OTC Analgesics     No Known Allergies     Current Outpatient Medications   Medication Sig Dispense Refill    atorvastatin (LIPITOR) 20 MG tablet Take 1 tablet by mouth daily      Probiotic Product (PROBIOTIC DAILY PO) Take by mouth 2 gummies daily      levothyroxine (SYNTHROID) 150 MCG tablet Take 1 tablet by mouth Daily 30 tablet 1    phentermine 15 MG capsule Take 15 mg by mouth every morning.  butalbital-APAP-caffeine (FIORICET) -40 MG CAPS per capsule Take 1 capsule by mouth every 4 hours as needed for Headaches 84 capsule 0    Multiple Vitamins-Calcium (ONE-A-DAY WOMENS FORMULA PO) Take by mouth       No current facility-administered medications for this encounter. Additional Comments: Pt here for f/u and states her TSH dropped. She has felt light-headed and felt like she was going to pass out and became extremely sweaty.      Electronically signed by Any Lomax CMA on 1/27/2022 at 2:28 PM

## 2022-01-27 NOTE — PROGRESS NOTES
[address] [address], [address] [facility]  Phone: [phone]  Fax: [phone]    RADIATION ONCOLOGY FOLLOW UP REPORT    PATIENT NAME:  Cindy Hennessy              : 1980  MEDICAL RECORD NO: [id number]    [facility] NO: [id number]        PROVIDER: Cara Crow MD      DATE OF SERVICE: 2022     FOLLOW UP PHYSICIANS:    Julio Sanches M.D.  [address] [place] [address]          DIAGNOSIS: Cancer Staging  Malignant neoplasm of thyroid gland [facility])  Staging form: Thyroid - Differentiated, AJCC 8th Edition  - Pathologic: Stage I (pT1b, pN0, cM0, Age at diagnosis: < 54 years) - Signed by Cara Crow MD on 3/2/2021         TREATMENT COURSE:   Oncology History   Malignant neoplasm of thyroid gland ([facility]   2021 Initial Diagnosis    Malignant neoplasm of thyroid gland ([facility]     2021 Surgery    Thyroid right lobe and isthmus partial thyroidectomy     2021 Surgery    Thyroid left lobe completion thyroidectomy     2021 -  Radiation    I 131 Therapy: 52. 4mCi         HPI:   Cindy Hennessy is a 42 y.o. female who has a history as above who returns today for routine follow-up visit. The patient was last seen by me in October of this year and was doing well at that time, recovering from the acute side effects of I-131 therapy. The most recent TSH was obtained on 2022 and noted to be less than 0.005U IU per mL while on 175 mcg of Synthroid which was subsequently decreased to 150 mcg of Synthroid daily. Currently, the patient reports to be doing well. Energy level has been diminished, particularly over the past 6 weeks. The patient denies any fevers, chills, or drenching night sweats. She reports she did have an episode before her levothyroxine dosing change diaphoresis and lightheadedness at work with complete resolution. Weight and appetite have been stable. Denies any chest pain or shortness of breath.   Denies any new lumps or bumps anywhere throughout the body. Denies the new onset of bony pain. The patient has not been experiencing any dysphagia, odynophagia, salivary gland pain, or taste changes. The patient denies any paresthesias. She reports occasional soreness in the anterior neck. She is doing range of motion exercises    The patient reports compliance with CDC Covid 19 measures      RADIOLOGIC STUDIES:  CTA head and neck 7/9/2021  Impression   1. No acute intracranial abnormality. 2. Unremarkable CTA of the head and neck. LABORATORY STUDIES:   CBC:   Lab Results   Component Value Date    WBC 9.8 12/29/2021    RBC 4.78 12/29/2021    HGB 14.4 12/29/2021    HCT 42.9 12/29/2021    MCV 89.7 12/29/2021    MCH 30.1 12/29/2021    MCHC 33.6 12/29/2021    RDW 11.1 12/29/2021     12/29/2021    MPV 9.4 12/29/2021     CMP:  Lab Results   Component Value Date     12/29/2021    K 4.0 12/29/2021     12/29/2021    CO2 24 12/29/2021    BUN 15 12/29/2021    CREATININE 0.6 12/29/2021    GFRAA >60 12/29/2021    LABGLOM >60 12/29/2021    GLUCOSE 127 12/29/2021    PROT 6.6 08/05/2021    PROT 7.0 07/12/2011    LABALBU 4.7 08/05/2021    CALCIUM 9.6 12/29/2021    BILITOT 0.2 08/05/2021    ALKPHOS 46 08/05/2021    AST 16 08/05/2021    ALT 20 08/05/2021     Onc labs: No results found for: CEA, LDH, AFP    MEDICATIONS:   Current Outpatient Medications   Medication Sig Dispense Refill    atorvastatin (LIPITOR) 20 MG tablet Take 1 tablet by mouth daily      Probiotic Product (PROBIOTIC DAILY PO) Take by mouth 2 gummies daily      levothyroxine (SYNTHROID) 150 MCG tablet Take 1 tablet by mouth Daily 30 tablet 1    phentermine 15 MG capsule Take 15 mg by mouth every morning.       butalbital-APAP-caffeine (FIORICET) -40 MG CAPS per capsule Take 1 capsule by mouth every 4 hours as needed for Headaches 84 capsule 0    Multiple Vitamins-Calcium (ONE-A-DAY WOMENS FORMULA PO) Take by mouth       No current facility-administered medications for this encounter. EXAMINATION:   Vitals:    01/27/22 1423   BP: (!) 152/76   Pulse: 92   Resp: 16   Temp: 97.3 °F (36.3 °C)   SpO2: 97%     The patient is in no acute distress. Neck: Supple no preauricular postauricular, submental, submandibular, cervical, supraclavicular, or infraclavicular lymphadenopathy is present. The thyroid bed is without masses or areas of nodularity. Heart: Regular rate and rhythm. No murmurs, clicks, or gallops are present. Lungs: Bilaterally clear to auscultation. No rales, rhonchi, or wheezing are present. Abdomen: Soft, nontender and nondistended. No hepatosplenomegaly or masses are appreciated. Extremities: No cyanosis, clubbing, or edema is present. Sensation to light touch is intact and symmetric bilaterally in the fingers and feet. ASSESSMENT AND PLAN:     Juventino Lennox is a 42 y.o. female who has a history of a stage I differentiated thyroid cancer who is now approximately 7 months after completion of I-131 therapy who is doing well at this time, still adjusting to her levothyroxine dosing. She is to have repeat TSH with serum thyroglobulin and antithyroglobulin antibody levels towards end of next month. If unremarkable, she is tentatively to return to see me in August of this year with labs and a neck ultrasound prior or to return sooner as needed. Continue range of motion exercises of her neck. The patient is to continue to follow CDC's Covid 19 precautionary measures.       Electronically signed by Destiny Fair MD on 1/27/2022 at 2:38 PM

## 2022-02-21 ENCOUNTER — HOSPITAL ENCOUNTER (OUTPATIENT)
Dept: INFUSION THERAPY | Age: 42
Discharge: HOME | End: 2022-02-21
Payer: COMMERCIAL

## 2022-02-21 DIAGNOSIS — C73 MALIGNANT NEOPLASM OF THYROID GLAND (HCC): ICD-10-CM

## 2022-02-21 PROCEDURE — 36415 COLL VENOUS BLD VENIPUNCTURE: CPT

## 2022-02-21 PROCEDURE — 86800 THYROGLOBULIN ANTIBODY: CPT

## 2022-02-21 PROCEDURE — 84432 ASSAY OF THYROGLOBULIN: CPT

## 2022-02-21 PROCEDURE — 86769 SARS-COV-2 COVID-19 ANTIBODY: CPT

## 2022-02-21 PROCEDURE — 84443 ASSAY THYROID STIM HORMONE: CPT

## 2022-02-22 LAB — TSH HIGH SENSITIVITY: 0.01 UIU/ML (ref 0.27–4.2)

## 2022-02-24 ENCOUNTER — TELEPHONE (OUTPATIENT)
Dept: RADIATION ONCOLOGY | Age: 42
End: 2022-02-24

## 2022-02-24 DIAGNOSIS — C73 MALIGNANT NEOPLASM OF THYROID GLAND (HCC): ICD-10-CM

## 2022-02-24 LAB
ANTITHYROGLOBULIN AB: <0.9 IU/ML (ref 0–4)
SARS-COV-2, IGG: NEGATIVE
THYROGLOBULIN BY LC-MS/MS, SERUM/PLASMA: ABNORMAL NG/ML (ref 1.3–31.8)
THYROGLOBULIN: 0.2 NG/ML (ref 1.3–31.8)

## 2022-02-24 RX ORDER — LEVOTHYROXINE SODIUM 0.12 MG/1
125 TABLET ORAL DAILY
Qty: 30 TABLET | Refills: 1 | Status: SHIPPED | OUTPATIENT
Start: 2022-02-24 | End: 2022-04-18 | Stop reason: SDUPTHER

## 2022-04-13 ENCOUNTER — HOSPITAL ENCOUNTER (OUTPATIENT)
Dept: INFUSION THERAPY | Age: 42
Discharge: HOME | End: 2022-04-13
Payer: COMMERCIAL

## 2022-04-13 DIAGNOSIS — C73 MALIGNANT NEOPLASM OF THYROID GLAND (HCC): ICD-10-CM

## 2022-04-13 LAB — TSH HIGH SENSITIVITY: 0.21 UIU/ML (ref 0.27–4.2)

## 2022-04-13 PROCEDURE — 84443 ASSAY THYROID STIM HORMONE: CPT

## 2022-04-13 PROCEDURE — 86800 THYROGLOBULIN ANTIBODY: CPT

## 2022-04-13 PROCEDURE — 84432 ASSAY OF THYROGLOBULIN: CPT

## 2022-04-13 PROCEDURE — 36415 COLL VENOUS BLD VENIPUNCTURE: CPT

## 2022-04-15 LAB
ANTITHYROGLOBULIN AB: <0.9 IU/ML (ref 0–4)
THYROGLOBULIN BY LC-MS/MS, SERUM/PLASMA: ABNORMAL NG/ML (ref 1.3–31.8)
THYROGLOBULIN: 0.2 NG/ML (ref 1.3–31.8)

## 2022-04-18 ENCOUNTER — TELEPHONE (OUTPATIENT)
Dept: RADIATION ONCOLOGY | Age: 42
End: 2022-04-18

## 2022-04-18 DIAGNOSIS — C73 MALIGNANT NEOPLASM OF THYROID GLAND (HCC): ICD-10-CM

## 2022-04-18 RX ORDER — LEVOTHYROXINE SODIUM 0.12 MG/1
125 TABLET ORAL DAILY
Qty: 30 TABLET | Refills: 3 | Status: SHIPPED | OUTPATIENT
Start: 2022-04-18

## 2022-04-18 NOTE — TELEPHONE ENCOUNTER
----- Message from Shana Dickens MD sent at 4/18/2022  3:32 PM EDT -----  Please notify patient that their lab results are good. Cont 125mcg levothyroxine daily. Above message given to pt. Advised pt to call with further questions or concerns. Pt voices understanding of above.

## 2022-06-07 ENCOUNTER — TELEPHONE (OUTPATIENT)
Dept: RADIATION ONCOLOGY | Age: 42
End: 2022-06-07

## 2022-06-07 DIAGNOSIS — C73 MALIGNANT NEOPLASM OF THYROID GLAND (HCC): ICD-10-CM

## 2022-06-07 NOTE — TELEPHONE ENCOUNTER
PC to clinic, pt c/o that right neck aches. Pt states has hx of this but has started bothering her again x  2-3 weeks. Pt denies any strenuous activity or straining. Denies any difficulty breathing or swallowing, and no palpable nodules or swelling noted. Dr. Shelbi Kruse aware of above, offered pt an appt with [facility] early next week and pt agreeable. Left message on return call to pt, pt scheduled for appt Tuesday June 14,2022 at 11:00 with Dr. Edna Rodriguez. Advised pt to return call to verify this appointment.

## 2022-06-14 ENCOUNTER — HOSPITAL ENCOUNTER (OUTPATIENT)
Dept: RADIATION ONCOLOGY | Age: 42
Discharge: HOME | End: 2022-06-14
Payer: COMMERCIAL

## 2022-06-14 ENCOUNTER — HOSPITAL ENCOUNTER (OUTPATIENT)
Dept: INFUSION THERAPY | Age: 42
Discharge: HOME | End: 2022-06-14
Payer: COMMERCIAL

## 2022-06-14 VITALS
WEIGHT: 182 LBS | BODY MASS INDEX: 30.32 KG/M2 | HEIGHT: 65 IN | RESPIRATION RATE: 16 BRPM | OXYGEN SATURATION: 96 % | DIASTOLIC BLOOD PRESSURE: 75 MMHG | HEART RATE: 80 BPM | TEMPERATURE: 98.9 F | SYSTOLIC BLOOD PRESSURE: 140 MMHG

## 2022-06-14 DIAGNOSIS — C73 MALIGNANT NEOPLASM OF THYROID GLAND (HCC): Primary | ICD-10-CM

## 2022-06-14 DIAGNOSIS — C73 MALIGNANT NEOPLASM OF THYROID GLAND (HCC): ICD-10-CM

## 2022-06-14 LAB
ALBUMIN SERPL-MCNC: 4.9 GM/DL (ref 3.4–5)
ALP BLD-CCNC: 59 IU/L (ref 40–129)
ALT SERPL-CCNC: 34 U/L (ref 10–40)
ANION GAP SERPL CALCULATED.3IONS-SCNC: 12 MMOL/L (ref 4–16)
AST SERPL-CCNC: 22 IU/L (ref 15–37)
BASOPHILS ABSOLUTE: 0 K/CU MM
BASOPHILS RELATIVE PERCENT: 0.2 % (ref 0–1)
BILIRUB SERPL-MCNC: 0.3 MG/DL (ref 0–1)
BUN BLDV-MCNC: 13 MG/DL (ref 6–23)
CALCIUM SERPL-MCNC: 9.8 MG/DL (ref 8.3–10.6)
CHLORIDE BLD-SCNC: 103 MMOL/L (ref 99–110)
CO2: 25 MMOL/L (ref 21–32)
CREAT SERPL-MCNC: 0.5 MG/DL (ref 0.6–1.1)
DIFFERENTIAL TYPE: ABNORMAL
EOSINOPHILS ABSOLUTE: 0.1 K/CU MM
EOSINOPHILS RELATIVE PERCENT: 1.3 % (ref 0–3)
GFR AFRICAN AMERICAN: >60 ML/MIN/1.73M2
GFR NON-AFRICAN AMERICAN: >60 ML/MIN/1.73M2
GLUCOSE BLD-MCNC: 86 MG/DL (ref 70–99)
HCT VFR BLD CALC: 43 % (ref 37–47)
HEMOGLOBIN: 14.8 GM/DL (ref 12.5–16)
LYMPHOCYTES ABSOLUTE: 2.4 K/CU MM
LYMPHOCYTES RELATIVE PERCENT: 37.3 % (ref 24–44)
MCH RBC QN AUTO: 30.5 PG (ref 27–31)
MCHC RBC AUTO-ENTMCNC: 34.4 % (ref 32–36)
MCV RBC AUTO: 88.5 FL (ref 78–100)
MONOCYTES ABSOLUTE: 0.6 K/CU MM
MONOCYTES RELATIVE PERCENT: 9.3 % (ref 0–4)
PDW BLD-RTO: 12.2 % (ref 11.7–14.9)
PLATELET # BLD: 289 K/CU MM (ref 140–440)
PMV BLD AUTO: 9.3 FL (ref 7.5–11.1)
POTASSIUM SERPL-SCNC: 4.2 MMOL/L (ref 3.5–5.1)
RBC # BLD: 4.86 M/CU MM (ref 4.2–5.4)
SEGMENTED NEUTROPHILS ABSOLUTE COUNT: 3.3 K/CU MM
SEGMENTED NEUTROPHILS RELATIVE PERCENT: 51.9 % (ref 36–66)
SODIUM BLD-SCNC: 140 MMOL/L (ref 135–145)
TOTAL PROTEIN: 7.2 GM/DL (ref 6.4–8.2)
TSH HIGH SENSITIVITY: 0.21 UIU/ML (ref 0.27–4.2)
VITAMIN D 25-HYDROXY: 54.32 NG/ML
WBC # BLD: 6.3 K/CU MM (ref 4–10.5)

## 2022-06-14 PROCEDURE — 36415 COLL VENOUS BLD VENIPUNCTURE: CPT

## 2022-06-14 PROCEDURE — 86800 THYROGLOBULIN ANTIBODY: CPT

## 2022-06-14 PROCEDURE — 80053 COMPREHEN METABOLIC PANEL: CPT

## 2022-06-14 PROCEDURE — 84443 ASSAY THYROID STIM HORMONE: CPT

## 2022-06-14 PROCEDURE — 84432 ASSAY OF THYROGLOBULIN: CPT

## 2022-06-14 PROCEDURE — 99214 OFFICE O/P EST MOD 30 MIN: CPT | Performed by: RADIOLOGY

## 2022-06-14 PROCEDURE — 85025 COMPLETE CBC W/AUTO DIFF WBC: CPT

## 2022-06-14 PROCEDURE — 82306 VITAMIN D 25 HYDROXY: CPT

## 2022-06-14 ASSESSMENT — PAIN DESCRIPTION - FREQUENCY: FREQUENCY: CONTINUOUS

## 2022-06-14 ASSESSMENT — PAIN - FUNCTIONAL ASSESSMENT: PAIN_FUNCTIONAL_ASSESSMENT: ACTIVITIES ARE NOT PREVENTED

## 2022-06-14 ASSESSMENT — PAIN DESCRIPTION - LOCATION: LOCATION: NECK

## 2022-06-14 ASSESSMENT — PAIN DESCRIPTION - ONSET: ONSET: ON-GOING

## 2022-06-14 ASSESSMENT — PAIN DESCRIPTION - PAIN TYPE: TYPE: ACUTE PAIN

## 2022-06-14 ASSESSMENT — PAIN SCALES - GENERAL: PAINLEVEL_OUTOF10: 6

## 2022-06-14 ASSESSMENT — PAIN DESCRIPTION - DESCRIPTORS: DESCRIPTORS: ACHING;SORE;TENDER

## 2022-06-14 NOTE — PROGRESS NOTES
RADIATION/ONCOLOGY FOLLOW-UP      Patient:  Atiya Solorzano  YOB: [birth year]    Primary Oncologist:  Igor Panchal MD  PCP:  GARRY Thomason NP      CHIEF COMPLAINT:         Neck swelling and some lightheadedness. DIAGNOSIS: Cancer Staging  Malignant neoplasm of thyroid gland ([facility] [address]  Staging form: Thyroid - Differentiated, AJCC 8th Edition  - Pathologic: Stage I (pT1b, pN0, cM0, Age at diagnosis: < 55 years) - Signed by Kristian Reeves MD on 3/2/2021           TREATMENT COURSE:       Oncology History   Malignant neoplasm of thyroid gland ([facility] [address]   2/1/2021 Initial Diagnosis     Malignant neoplasm of thyroid gland ([facility] [address]      2/2/2021 Surgery     Thyroid right lobe and isthmus partial thyroidectomy      5/11/2021 Surgery     Thyroid left lobe completion thyroidectomy      6/17/2021 -  Radiation     I 131 Therapy: 52. 4mCi         PROBLEM LIST:       Problem List Items Addressed This Visit     Malignant neoplasm of thyroid gland [facility])        Cancer Staging  Malignant neoplasm of thyroid gland ([facility] [facility]  Staging form: Thyroid - Differentiated, AJCC 8th Edition  - Pathologic: Stage I (pT1b, pN0, cM0, Age at diagnosis: < 55 years) - Signed by Kristian Reeves MD on 3/2/2021      INTERVAL HISTORY:       Atiya Solorzano is a 36 y.o. female who presents today for a follow-up visit. This is an unscheduled visit. Chelo is here with her mom. In January it sounds like she became diaphoretic and went to the emergency room. Her EKG and blood work was normal with the exception of her thyroid. In addition to her thyroid supplementation she is now on metformin. She does not feel that her symptoms are changed since metformin was started. She is worried that her neck is swollen. At times it feels like it is pulling from the inside. She has no difficulty swallowing. No shortness of breath. Some of these issues have been ongoing.   She notes the leg pain she was having is improving. Her mom has not noted any visible neck swelling. Notes no depression. Patient's medications, allergies, past medical, surgical, social and family histories were reviewed and updated as appropriate. REVIEW OF SYSTEMS:       CONSTITUTIONAL:    Appetite good. Energy level stable. HEENT:  No sores in the mouth, sore throat, changes in voice quality. CARDIOVASCULAR:  No chest pain or palpitations. RESPIRATORY:  No cough, SOB or hemoptysis. GI: No N/V/D/C. No blood per rectum. :  No urinary frequency, dysuria or urgency. MUSCULOSKELETAL:   No specific bone or joint aches. No edema. NEUROLOGIC:  Denies HA,  or focal weakness. No sensory changes. PHYSICAL EXAM:       BP (!) 140/75   Pulse 80   Temp 98.9 °F (37.2 °C) (Temporal)   Resp 16   Ht 5' 5\" (1.651 m)   Wt 182 lb (82.6 kg)   LMP 05/02/2017   SpO2 96%   BMI 30.29 kg/m²     General appearance: alert and cooperative  Head: Normocephalic, without obvious abnormality, atraumatic  Neck: No palpable lymphadenopathy in supraclavicular or cervical chains. Neck not swollen. Incision from thyroidectomy is intact. I cannot palpate any cervical or supraclavicular nodes. No evidence of local recurrence on exam.  Palpable axillary nodes.     Extremities: without cyanosis, clubbing, edema or asymmetry    LABS:     CBC:   Lab Results   Component Value Date    WBC 9.8 12/29/2021    HGB 14.4 12/29/2021    MCV 89.7 12/29/2021     12/29/2021       BMP:   Lab Results   Component Value Date     12/29/2021    K 4.0 12/29/2021    CO2 24 12/29/2021    BUN 15 12/29/2021    CREATININE 0.6 12/29/2021    MG 2.3 07/09/2021       HEPATIC:  Lab Results   Component Value Date    AST 16 08/05/2021    ALT 20 08/05/2021    ALKPHOS 46 08/05/2021    PROT 6.6 08/05/2021    PROT 7.0 07/12/2011    BILITOT 0.2 08/05/2021       TUMOR MARKERS: No results found for: PSA, CEA, , LF0218,     IMAGING:         LABS: The patient's pertinent lab results were reviewed by me. ASSESSMENT        Lightheadedness and the feeling that her neck is swollen internally, , though this is not visible on exam.    I am not sure what is causing her symptoms. Last blood work 8 weeks ago was satisfactory. Imaging had been scheduled for August of this year. Told her I did not think this was due to the Iodine radiation given 1 yr ago. Possibly she has some internal swelling due to the surgery? ? PLAN:       Her exam shows no clinical evidence of recurrence however I will check her vitamin D level, recheck her thyroid panel, recheck a CBC and CMP. She may need a referral to endocrine therapy. I will also move up her neck ultrasound which was scheduled to August to the near future. I will have Dr. Clari Mart call her with the results. Possibly PT eval?      The visit lasted 20 minutes and at least 50% of the time spent with Chelo Dial was in  and education.      Artur Bender MD

## 2022-06-14 NOTE — PROGRESS NOTES
Chelo Calvert  6/14/2022    Patient is seen today for follow up. Vitals:    06/14/22 1143   BP: (!) 140/75   Pulse: 80   Resp: 16   Temp: 98.9 °F (37.2 °C)   SpO2: 96%        Oxygen Therapy  SpO2: 96 %  Pulse Oximeter Device Mode: Intermittent  Pulse Oximeter Device Location: Right,Finger  O2 Device: None (Room air)  Skin Assessment: Clean, dry, & intact    Wt Readings from Last 3 Encounters:   06/14/22 182 lb (82.6 kg)   01/27/22 180 lb 9.6 oz (81.9 kg)   10/25/21 185 lb 3.2 oz (84 kg)       Pain Assessment  Pain Assessment: 0-10  Pain Level: 6  Patient's Stated Pain Goal: 0 - No pain  Pain Location: Neck  Pain Descriptors: Aching,Sore,Tender  Functional Pain Assessment: Activities are not prevented  Pain Type: Acute pain  Pain Frequency: Continuous  Pain Onset: On-going  Non-Pharmaceutical Pain Intervention(s): Repositioned  Response to Pain Intervention: None (pain unchanged or no improvement)  Multiple Pain Sites: No  OTC Analgesics     No Known Allergies     Current Outpatient Medications   Medication Sig Dispense Refill    metFORMIN (GLUCOPHAGE) 500 MG tablet Take 1 tablet by mouth in the morning and at bedtime      levothyroxine (SYNTHROID) 125 MCG tablet Take 1 tablet by mouth Daily 30 tablet 3    atorvastatin (LIPITOR) 20 MG tablet Take 1 tablet by mouth daily      Probiotic Product (PROBIOTIC DAILY PO) Take by mouth 2 gummies daily      butalbital-APAP-caffeine (FIORICET) -40 MG CAPS per capsule Take 1 capsule by mouth every 4 hours as needed for Headaches 84 capsule 0    Multiple Vitamins-Calcium (ONE-A-DAY WOMENS FORMULA PO) Take by mouth       No current facility-administered medications for this encounter. Additional Comments: Pt to clinic today with concerns, reports tenderness/soreness in neck rates \"6/10\", has tried OTC pain meds, not effective.      Electronically signed by Parminder Salgado RN on 6/14/2022 at 11:48 AM

## 2022-06-15 ENCOUNTER — HOSPITAL ENCOUNTER (OUTPATIENT)
Dept: ULTRASOUND IMAGING | Age: 42
Discharge: HOME | End: 2022-06-15
Payer: COMMERCIAL

## 2022-06-15 DIAGNOSIS — C73 MALIGNANT NEOPLASM OF THYROID GLAND (HCC): ICD-10-CM

## 2022-06-15 PROCEDURE — 76536 US EXAM OF HEAD AND NECK: CPT

## 2022-06-17 ENCOUNTER — TELEPHONE (OUTPATIENT)
Dept: RADIATION ONCOLOGY | Age: 42
End: 2022-06-17

## 2022-06-17 DIAGNOSIS — C73 MALIGNANT NEOPLASM OF THYROID GLAND (HCC): ICD-10-CM

## 2022-06-17 NOTE — TELEPHONE ENCOUNTER
Referral to [facility] Endocrinology placed by Dr. Юлия Sousa. Recent notes, testing and pt demographics faxed to Dr. Madelaine Kwon's office per pt request.  Info faxed to [phone], confirmation received.

## 2022-07-19 ENCOUNTER — TELEPHONE (OUTPATIENT)
Dept: RADIATION ONCOLOGY | Age: 42
End: 2022-07-19

## 2022-07-19 DIAGNOSIS — C73 MALIGNANT NEOPLASM OF THYROID GLAND (HCC): Primary | ICD-10-CM

## 2022-07-19 NOTE — TELEPHONE ENCOUNTER
Lul Venegas Endocrinologist, Dr. Serafin Kwon's office to follow up on referral sent 6/17. [facility] staff member states several calls to pt with no return call. Reviewed demographics with staff, incorrect patient telephone number noted. This RN called pt and gave Dr. Patricio Day office number [phone] to pt and pt to call to schedule consult appointment. Pt advised to call this RN if any problems.

## 2022-08-11 DIAGNOSIS — C73 MALIGNANT NEOPLASM OF THYROID GLAND (HCC): Primary | ICD-10-CM

## 2022-08-22 ENCOUNTER — HOSPITAL ENCOUNTER (OUTPATIENT)
Dept: INFUSION THERAPY | Age: 42
Discharge: HOME | End: 2022-08-22
Payer: COMMERCIAL

## 2022-08-22 DIAGNOSIS — C73 MALIGNANT NEOPLASM OF THYROID GLAND (HCC): ICD-10-CM

## 2022-08-22 LAB — TSH HIGH SENSITIVITY: 0.34 UIU/ML (ref 0.27–4.2)

## 2022-08-22 PROCEDURE — 86800 THYROGLOBULIN ANTIBODY: CPT

## 2022-08-22 PROCEDURE — 84443 ASSAY THYROID STIM HORMONE: CPT

## 2022-08-22 PROCEDURE — 84432 ASSAY OF THYROGLOBULIN: CPT

## 2022-08-25 LAB
ANTITHYROGLOBULIN AB: <0.9 IU/ML (ref 0–4)
THYROGLOBULIN BY LC-MS/MS, SERUM/PLASMA: ABNORMAL NG/ML (ref 1.3–31.8)
THYROGLOBULIN: 0.1 NG/ML (ref 1.3–31.8)

## 2022-08-30 ENCOUNTER — HOSPITAL ENCOUNTER (OUTPATIENT)
Dept: RADIATION ONCOLOGY | Age: 42
Discharge: HOME | End: 2022-08-30
Payer: COMMERCIAL

## 2022-08-30 VITALS
WEIGHT: 185.6 LBS | SYSTOLIC BLOOD PRESSURE: 134 MMHG | TEMPERATURE: 97.7 F | HEART RATE: 82 BPM | HEIGHT: 65 IN | BODY MASS INDEX: 30.92 KG/M2 | DIASTOLIC BLOOD PRESSURE: 75 MMHG | OXYGEN SATURATION: 96 %

## 2022-08-30 DIAGNOSIS — C73 MALIGNANT NEOPLASM OF THYROID GLAND (HCC): Primary | ICD-10-CM

## 2022-08-30 PROCEDURE — 99213 OFFICE O/P EST LOW 20 MIN: CPT | Performed by: RADIOLOGY

## 2022-08-30 PROCEDURE — 99211 OFF/OP EST MAY X REQ PHY/QHP: CPT

## 2022-08-30 NOTE — PROGRESS NOTES
[address] [id number] [facility], [address] [facility]  Phone: [phone]  Fax: [phone]    RADIATION ONCOLOGY FOLLOW UP REPORT    PATIENT NAME:  Carmita Acuña              : 1980  MEDICAL RECORD NO: [id number]    [facility] NO: [id number]        PROVIDER: Froylan Moon MD      DATE OF SERVICE: 2022     FOLLOW UP PHYSICIANS:    Kathia Olvera M.D.  [address] [place], [place] [address]          DIAGNOSIS: Cancer Staging  Malignant neoplasm of thyroid gland [facility])  Staging form: Thyroid - Differentiated, AJCC 8th Edition  - Pathologic: Stage I (pT1b, pN0, cM0, Age at diagnosis: < 54 years) - Signed by Froylan Moon MD on 3/2/2021         TREATMENT COURSE:   Oncology History   Malignant neoplasm of thyroid gland ([facility] [address]   2021 Initial Diagnosis    Malignant neoplasm of thyroid gland ([facility] [address]     2021 Surgery    Thyroid right lobe and isthmus partial thyroidectomy     2021 Surgery    Thyroid left lobe completion thyroidectomy     2021 -  Radiation    I 131 Therapy: 52. 4mCi         HPI:   Carmita Acuña is a 36 y.o. female who has a history as above who returns today for routine follow-up visit. The patient was last seen by me in January of this year and was doing well at that time without evidence of recurrent or metastatic disease. She was last evaluated in my office by Dr. Virginia Rene in  with complaints of recent diaphoresis prompting an ER visit. No clinical evidence of recurrence was noted on exam.  She was referred to endocrine at [facility]- Dr. Jos Torres, but has apparently not yet been able to get an appointment. The most recent TSH was obtained on 2022 and noted to be 0.344 you IU per mL while on 125 mcg of levothyroxine daily. Serum thyroglobulin and antithyroglobulin antibodies were 0.1 and less than 0.9, respectively, on the same date.   Neck ultrasound from 6/15/2022 showed a 1.1 cm left level 2 lymph node which was felt to be reactive and otherwise the ultrasound was unremarkable. Currently, the patient reports to be doing well. Energy level has been down a bit lately. The patient denies any fevers, chills, or drenching night sweats. Weight and appetite have been stable. Denies any chest pain or shortness of breath. Denies any new lumps or bumps anywhere throughout the body. Denies the new onset of bony pain. The patient has not been experiencing any dysphagia, odynophagia, salivary gland pain, or taste changes. The patient denies any paresthesias. The patient reports compliance with CDC Covid 19 measures      RADIOLOGIC STUDIES:  6/15/2022 neck ultrasound  Impression   1.1 x 0.5 cm left level 2 lymph node which is otherwise normal in appearance   and likely reactive. Recommend follow-up per clinical protocol given history   of thyroid cancer. No abnormal cervical adenopathy is otherwise identified. Status post thyroidectomy.        LABORATORY STUDIES:   CBC:   Lab Results   Component Value Date/Time    WBC 6.3 06/14/2022 12:09 PM    RBC 4.86 06/14/2022 12:09 PM    HGB 14.8 06/14/2022 12:09 PM    HCT 43.0 06/14/2022 12:09 PM    MCV 88.5 06/14/2022 12:09 PM    MCH 30.5 06/14/2022 12:09 PM    MCHC 34.4 06/14/2022 12:09 PM    RDW 12.2 06/14/2022 12:09 PM     06/14/2022 12:09 PM    MPV 9.3 06/14/2022 12:09 PM     CMP:  Lab Results   Component Value Date/Time     06/14/2022 12:09 PM    K 4.2 06/14/2022 12:09 PM     06/14/2022 12:09 PM    CO2 25 06/14/2022 12:09 PM    BUN 13 06/14/2022 12:09 PM    CREATININE 0.5 06/14/2022 12:09 PM    GFRAA >60 06/14/2022 12:09 PM    LABGLOM >60 06/14/2022 12:09 PM    GLUCOSE 86 06/14/2022 12:09 PM    PROT 7.2 06/14/2022 12:09 PM    PROT 7.0 07/12/2011 08:50 AM    LABALBU 4.9 06/14/2022 12:09 PM    CALCIUM 9.8 06/14/2022 12:09 PM    BILITOT 0.3 06/14/2022 12:09 PM    ALKPHOS 59 06/14/2022 12:09 PM    AST 22 06/14/2022 12:09 PM    ALT 34 06/14/2022 12:09 PM     Onc labs: No results found for: CEA, LDH, AFP  Component Ref Range & Units 8/22/22 1450 6/14/22 1209 4/13/22 0943 2/21/22 1315 10/13/21 1350 8/5/21 1409 7/9/21 1653   TSH, High Sensitivity 0.270 - 4.20 uIu/ml 0.344  0.211 Low  CM  0.206 Low  CM  0.007 Low  CM  0.014 Low  CM  1.170 CM  8.440 High       MEDICATIONS:   Current Outpatient Medications   Medication Sig Dispense Refill    metFORMIN (GLUCOPHAGE) 500 MG tablet Take 1 tablet by mouth in the morning and at bedtime      levothyroxine (SYNTHROID) 125 MCG tablet Take 1 tablet by mouth Daily 30 tablet 3    atorvastatin (LIPITOR) 20 MG tablet Take 1 tablet by mouth daily      Probiotic Product (PROBIOTIC DAILY PO) Take by mouth 2 gummies daily      butalbital-APAP-caffeine (FIORICET) -40 MG CAPS per capsule Take 1 capsule by mouth every 4 hours as needed for Headaches 84 capsule 0    Multiple Vitamins-Calcium (ONE-A-DAY WOMENS FORMULA PO) Take by mouth       No current facility-administered medications for this encounter. EXAMINATION:   Vitals:    08/30/22 1034   BP: 134/75   Pulse: 82   Temp: 97.7 °F (36.5 °C)   SpO2: 96%     The patient is in no acute distress. Neck: Supple no preauricular postauricular, submental, submandibular, cervical, supraclavicular, or infraclavicular lymphadenopathy is present. The thyroid bed is without masses or areas of nodularity. Heart: Regular rate and rhythm. No murmurs, clicks, or gallops are present. Lungs: Bilaterally clear to auscultation. No rales, rhonchi, or wheezing are present. Abdomen: Soft, nontender and nondistended. No hepatosplenomegaly or masses are appreciated. Extremities: No cyanosis, clubbing, or edema is present. Sensation to light touch is intact and symmetric bilaterally in the fingers and feet.     ASSESSMENT AND PLAN:     Diogenes Valdez is a 36 y.o. female who has a history of a stage I differentiated thyroid cancer who is now approximately 1 year after completion of I-131 therapy who is doing well at this time without evidence of recurrent or metastatic disease. The patient is to return to see me in 6 months with labs and a neck ultrasound prior or to return sooner as needed. I will refer her to a different [facility]ocrinologist at [facility]. She is to continue on 125 mcg of levothyroxine daily for now. The patient is to continue to follow CDC's Covid 19 precautionary measures.       Electronically signed by Sergio Vizcaino MD on 8/30/2022 at 11:00 AM

## 2022-08-30 NOTE — PROGRESS NOTES
Chelo Morecelsoanjali Mariscal  8/30/2022    Patient is seen today for follow up. Vitals:    08/30/22 1034   BP: 134/75   Pulse: 82   Temp: 97.7 °F (36.5 °C)   SpO2: 96%        Oxygen Therapy  SpO2: 96 %  Pulse Oximetry Type: Intermittent  Pulse Oximeter Device Mode: Intermittent  Pulse Oximeter Device Location: Finger  O2 Device: None (Room air)  Skin Assessment: Clean, dry, & intact    Wt Readings from Last 3 Encounters:   08/30/22 185 lb 9.6 oz (84.2 kg)   06/14/22 182 lb (82.6 kg)   01/27/22 180 lb 9.6 oz (81.9 kg)       Pain Assessment  Pain Assessment: None - Denies Pain  Denies Need for Intervention     No Known Allergies     Current Outpatient Medications   Medication Sig Dispense Refill    metFORMIN (GLUCOPHAGE) 500 MG tablet Take 1 tablet by mouth in the morning and at bedtime      levothyroxine (SYNTHROID) 125 MCG tablet Take 1 tablet by mouth Daily 30 tablet 3    atorvastatin (LIPITOR) 20 MG tablet Take 1 tablet by mouth daily      Probiotic Product (PROBIOTIC DAILY PO) Take by mouth 2 gummies daily      butalbital-APAP-caffeine (FIORICET) -40 MG CAPS per capsule Take 1 capsule by mouth every 4 hours as needed for Headaches 84 capsule 0    Multiple Vitamins-Calcium (ONE-A-DAY WOMENS FORMULA PO) Take by mouth       No current facility-administered medications for this encounter. Additional Comments: here for f/u appt and states she never received her results from her U/S in June. She never received a referral to [facility] endocrinology. She has been out of Synthroid since Friday.      Electronically signed by Janet Maldonado CMA on 8/30/2022 at 10:36 AM

## 2022-09-02 DIAGNOSIS — C73 MALIGNANT NEOPLASM OF THYROID GLAND (HCC): Primary | ICD-10-CM

## 2023-02-14 DIAGNOSIS — C73 MALIGNANT NEOPLASM OF THYROID GLAND (HCC): Primary | ICD-10-CM

## 2023-02-22 ENCOUNTER — TELEPHONE (OUTPATIENT)
Dept: RADIATION ONCOLOGY | Age: 43
End: 2023-02-22

## 2023-02-22 DIAGNOSIS — C73 MALIGNANT NEOPLASM OF THYROID GLAND (HCC): Primary | ICD-10-CM

## 2023-02-22 NOTE — TELEPHONE ENCOUNTER
Telephone call from pt, pt reports has labs and follow up with [facility] Endocrinology next month. Pt wishes to cancel labs/flu with Dr. Malik Patel next week due to upcoming [facility] appts. Pt wishes to follow up with [facility] and return to Rad Onc PRN. Discussed with Dr. Malik Patel and she is agreeable. Direct contact info given, pt advised to call with any future needs, pt voices understanding.

## 2023-06-09 ENCOUNTER — HOSPITAL ENCOUNTER (OUTPATIENT)
Dept: GENERAL RADIOLOGY | Age: 43
Discharge: HOME | End: 2023-06-09
Attending: SPECIALIST
Payer: COMMERCIAL

## 2023-06-09 DIAGNOSIS — R19.7 DIARRHEA, UNSPECIFIED TYPE: ICD-10-CM

## 2023-06-09 DIAGNOSIS — R10.9 ABDOMINAL PAIN, UNSPECIFIED ABDOMINAL LOCATION: ICD-10-CM

## 2023-06-09 PROCEDURE — 74248 X-RAY SM INT F-THRU STD: CPT

## 2023-09-27 ENCOUNTER — APPOINTMENT (OUTPATIENT)
Dept: CT IMAGING | Age: 43
End: 2023-09-27
Payer: COMMERCIAL

## 2023-09-27 ENCOUNTER — HOSPITAL ENCOUNTER (EMERGENCY)
Age: 43
Discharge: HOME | End: 2023-09-27
Payer: COMMERCIAL

## 2023-09-27 VITALS
WEIGHT: 190 LBS | HEART RATE: 67 BPM | SYSTOLIC BLOOD PRESSURE: 132 MMHG | DIASTOLIC BLOOD PRESSURE: 95 MMHG | TEMPERATURE: 98.2 F | BODY MASS INDEX: 31.65 KG/M2 | OXYGEN SATURATION: 99 % | HEIGHT: 65 IN | RESPIRATION RATE: 18 BRPM

## 2023-09-27 DIAGNOSIS — M54.2 NECK PAIN: Primary | ICD-10-CM

## 2023-09-27 LAB
ALBUMIN SERPL-MCNC: 4.8 GM/DL (ref 3.4–5)
ALP BLD-CCNC: 60 IU/L (ref 40–128)
ALT SERPL-CCNC: 54 U/L (ref 10–40)
ANION GAP SERPL CALCULATED.3IONS-SCNC: 13 MMOL/L (ref 4–16)
AST SERPL-CCNC: 29 IU/L (ref 15–37)
BASOPHILS ABSOLUTE: 0 K/CU MM
BASOPHILS RELATIVE PERCENT: 0.6 % (ref 0–1)
BILIRUB SERPL-MCNC: 0.2 MG/DL (ref 0–1)
BUN SERPL-MCNC: 8 MG/DL (ref 6–23)
CALCIUM SERPL-MCNC: 9.4 MG/DL (ref 8.3–10.6)
CHLORIDE BLD-SCNC: 101 MMOL/L (ref 99–110)
CO2: 23 MMOL/L (ref 21–32)
CREAT SERPL-MCNC: 0.6 MG/DL (ref 0.6–1.1)
DIFFERENTIAL TYPE: ABNORMAL
EOSINOPHILS ABSOLUTE: 0.1 K/CU MM
EOSINOPHILS RELATIVE PERCENT: 1.2 % (ref 0–3)
GFR SERPL CREATININE-BSD FRML MDRD: >60 ML/MIN/1.73M2
GLUCOSE SERPL-MCNC: 144 MG/DL (ref 70–99)
HCT VFR BLD CALC: 44.8 % (ref 37–47)
HEMOGLOBIN: 14.9 GM/DL (ref 12.5–16)
IMMATURE NEUTROPHIL %: 0.3 % (ref 0–0.43)
LYMPHOCYTES ABSOLUTE: 2.6 K/CU MM
LYMPHOCYTES RELATIVE PERCENT: 39.9 % (ref 24–44)
MCH RBC QN AUTO: 30.7 PG (ref 27–31)
MCHC RBC AUTO-ENTMCNC: 33.3 % (ref 32–36)
MCV RBC AUTO: 92.2 FL (ref 78–100)
MONOCYTES ABSOLUTE: 0.5 K/CU MM
MONOCYTES RELATIVE PERCENT: 7.2 % (ref 0–4)
NUCLEATED RBC %: 0 %
PDW BLD-RTO: 11.5 % (ref 11.7–14.9)
PLATELET # BLD: 294 K/CU MM (ref 140–440)
PMV BLD AUTO: 9.9 FL (ref 7.5–11.1)
POTASSIUM SERPL-SCNC: 4.4 MMOL/L (ref 3.5–5.1)
RBC # BLD: 4.86 M/CU MM (ref 4.2–5.4)
SEGMENTED NEUTROPHILS ABSOLUTE COUNT: 3.3 K/CU MM
SEGMENTED NEUTROPHILS RELATIVE PERCENT: 50.8 % (ref 36–66)
SODIUM BLD-SCNC: 137 MMOL/L (ref 135–145)
TOTAL IMMATURE NEUTOROPHIL: 0.02 K/CU MM
TOTAL NUCLEATED RBC: 0 K/CU MM
TOTAL PROTEIN: 7.5 GM/DL (ref 6.4–8.2)
TSH SERPL DL<=0.005 MIU/L-ACNC: 0.72 UIU/ML (ref 0.27–4.2)
WBC # BLD: 6.5 K/CU MM (ref 4–10.5)

## 2023-09-27 PROCEDURE — 84443 ASSAY THYROID STIM HORMONE: CPT

## 2023-09-27 PROCEDURE — 70491 CT SOFT TISSUE NECK W/DYE: CPT

## 2023-09-27 PROCEDURE — 80053 COMPREHEN METABOLIC PANEL: CPT

## 2023-09-27 PROCEDURE — 6370000000 HC RX 637 (ALT 250 FOR IP): Performed by: PHYSICIAN ASSISTANT

## 2023-09-27 PROCEDURE — 6360000004 HC RX CONTRAST MEDICATION: Performed by: PHYSICIAN ASSISTANT

## 2023-09-27 PROCEDURE — 99285 EMERGENCY DEPT VISIT HI MDM: CPT

## 2023-09-27 PROCEDURE — 85025 COMPLETE CBC W/AUTO DIFF WBC: CPT

## 2023-09-27 RX ORDER — PROPRANOLOL HYDROCHLORIDE 20 MG/1
20 TABLET ORAL 2 TIMES DAILY
COMMUNITY
Start: 2023-09-14

## 2023-09-27 RX ORDER — PAROXETINE 10 MG/1
10 TABLET, FILM COATED ORAL DAILY
COMMUNITY
Start: 2023-09-14

## 2023-09-27 RX ORDER — ACETAMINOPHEN 500 MG
1000 TABLET ORAL ONCE
Status: COMPLETED | OUTPATIENT
Start: 2023-09-27 | End: 2023-09-27

## 2023-09-27 RX ADMIN — ACETAMINOPHEN 1000 MG: 500 TABLET ORAL at 22:03

## 2023-09-27 RX ADMIN — IOPAMIDOL 75 ML: 755 INJECTION, SOLUTION INTRAVENOUS at 20:17

## 2023-09-27 ASSESSMENT — PAIN DESCRIPTION - LOCATION
LOCATION: NECK
LOCATION: NECK

## 2023-09-27 ASSESSMENT — PAIN DESCRIPTION - DESCRIPTORS: DESCRIPTORS: ACHING

## 2023-09-27 ASSESSMENT — PAIN SCALES - GENERAL
PAINLEVEL_OUTOF10: 8
PAINLEVEL_OUTOF10: 7

## 2023-09-27 ASSESSMENT — PAIN - FUNCTIONAL ASSESSMENT: PAIN_FUNCTIONAL_ASSESSMENT: 0-10

## 2023-09-27 NOTE — ED TRIAGE NOTES
Patient having neck pain that has been worsening for 2 months. Pain is in the front of her neck, around her throat, says that she is having some trouble swallowing that started yesterday but has been able to eat and drink still. Says it feels like there is pressure. Endocrinology cannot see her for another 3 months. Hx of thyroid cancer 2 years ago with thyroidectomy.

## 2024-02-26 ENCOUNTER — TRANSCRIBE ORDERS (OUTPATIENT)
Dept: ADMINISTRATIVE | Age: 44
End: 2024-02-26

## 2024-02-26 DIAGNOSIS — R22.1 NECK MASS: Primary | ICD-10-CM

## 2024-03-12 ENCOUNTER — HOSPITAL ENCOUNTER (OUTPATIENT)
Dept: CT IMAGING | Age: 44
Discharge: HOME | End: 2024-03-12
Payer: COMMERCIAL

## 2024-03-12 ENCOUNTER — HOSPITAL ENCOUNTER (OUTPATIENT)
Age: 44
Discharge: HOME | End: 2024-03-12
Payer: COMMERCIAL

## 2024-03-12 DIAGNOSIS — R22.1 NECK MASS: ICD-10-CM

## 2024-03-12 LAB
ANION GAP SERPL CALCULATED.3IONS-SCNC: 12 MMOL/L (ref 7–16)
BUN SERPL-MCNC: 11 MG/DL (ref 6–23)
CALCIUM SERPL-MCNC: 9.6 MG/DL (ref 8.3–10.6)
CHLORIDE BLD-SCNC: 102 MMOL/L (ref 99–110)
CO2: 27 MMOL/L (ref 21–32)
CREAT SERPL-MCNC: 0.7 MG/DL (ref 0.6–1.1)
EGFR, POC: >60 ML/MIN/1.73M2
GFR SERPL CREATININE-BSD FRML MDRD: >60 ML/MIN/1.73M2
GLUCOSE SERPL-MCNC: 147 MG/DL (ref 70–99)
POC CREATININE: 0.6 MG/DL (ref 0.5–1.2)
POTASSIUM SERPL-SCNC: 4.2 MMOL/L (ref 3.5–5.1)
SODIUM BLD-SCNC: 141 MMOL/L (ref 135–145)

## 2024-03-12 PROCEDURE — 6360000004 HC RX CONTRAST MEDICATION: Performed by: NURSE PRACTITIONER

## 2024-03-12 PROCEDURE — 36415 COLL VENOUS BLD VENIPUNCTURE: CPT

## 2024-03-12 PROCEDURE — 80048 BASIC METABOLIC PNL TOTAL CA: CPT

## 2024-03-12 PROCEDURE — 70491 CT SOFT TISSUE NECK W/DYE: CPT

## 2024-03-12 PROCEDURE — 2580000003 HC RX 258: Performed by: NURSE PRACTITIONER

## 2024-03-12 PROCEDURE — 82565 ASSAY OF CREATININE: CPT

## 2024-03-12 RX ORDER — SODIUM CHLORIDE 9 MG/ML
10 INJECTION, SOLUTION INTRAMUSCULAR; INTRAVENOUS; SUBCUTANEOUS PRN
Status: DISCONTINUED | OUTPATIENT
Start: 2024-03-12 | End: 2024-03-13 | Stop reason: HOSPADM

## 2024-03-12 RX ADMIN — SODIUM CHLORIDE, PRESERVATIVE FREE 10 ML: 5 INJECTION INTRAVENOUS at 11:46

## 2024-03-12 RX ADMIN — IOPAMIDOL 75 ML: 755 INJECTION, SOLUTION INTRAVENOUS at 11:50

## 2025-08-05 RX ORDER — TIRZEPATIDE 2.5 MG/.5ML
INJECTION, SOLUTION SUBCUTANEOUS WEEKLY
COMMUNITY

## 2025-08-05 RX ORDER — ESTRADIOL 0.5 MG/1
0.5 TABLET ORAL DAILY
COMMUNITY